# Patient Record
Sex: MALE | Race: WHITE | NOT HISPANIC OR LATINO | Employment: OTHER | RURAL
[De-identification: names, ages, dates, MRNs, and addresses within clinical notes are randomized per-mention and may not be internally consistent; named-entity substitution may affect disease eponyms.]

---

## 2022-12-23 ENCOUNTER — HOSPITAL ENCOUNTER (INPATIENT)
Facility: HOSPITAL | Age: 77
LOS: 13 days | Discharge: HOME | DRG: 950 | End: 2023-01-05
Attending: HOSPITALIST | Admitting: HOSPITALIST
Payer: MEDICARE

## 2022-12-23 DIAGNOSIS — Z96.641 STATUS POST TOTAL REPLACEMENT OF RIGHT HIP: ICD-10-CM

## 2022-12-23 DIAGNOSIS — T81.40XA: Primary | ICD-10-CM

## 2022-12-23 PROBLEM — I10 HTN (HYPERTENSION): Status: ACTIVE | Noted: 2022-12-23

## 2022-12-23 LAB
BILIRUB UR QL STRIP: NEGATIVE
CLARITY UR: CLEAR
COLOR UR: YELLOW
GLUCOSE UR STRIP-MCNC: NEGATIVE MG/DL
KETONES UR STRIP-SCNC: NEGATIVE MG/DL
LEUKOCYTE ESTERASE UR QL STRIP: NEGATIVE
NITRITE UR QL STRIP: NEGATIVE
PH UR STRIP: 7 PH UNITS
PROT UR QL STRIP: NEGATIVE
RBC # UR STRIP: NEGATIVE /UL
SP GR UR STRIP: 1.01
UROBILINOGEN UR STRIP-ACNC: 0.2 MG/DL

## 2022-12-23 PROCEDURE — 97110 THERAPEUTIC EXERCISES: CPT

## 2022-12-23 PROCEDURE — 11000004 HC SNF PRIVATE

## 2022-12-23 PROCEDURE — 99305 1ST NF CARE MODERATE MDM 35: CPT | Mod: AI,,, | Performed by: HOSPITALIST

## 2022-12-23 PROCEDURE — 81003 URINALYSIS AUTO W/O SCOPE: CPT | Performed by: HOSPITALIST

## 2022-12-23 PROCEDURE — 97163 PT EVAL HIGH COMPLEX 45 MIN: CPT

## 2022-12-23 PROCEDURE — 27000958

## 2022-12-23 PROCEDURE — 99900035 HC TECH TIME PER 15 MIN (STAT)

## 2022-12-23 PROCEDURE — 25000003 PHARM REV CODE 250

## 2022-12-23 PROCEDURE — 63600175 PHARM REV CODE 636 W HCPCS

## 2022-12-23 PROCEDURE — 99305 PR NURSING FACILITY CARE, INIT, MOD SEVERITY: ICD-10-PCS | Mod: AI,,, | Performed by: HOSPITALIST

## 2022-12-23 RX ORDER — ACETAMINOPHEN 325 MG/1
650 TABLET ORAL EVERY 6 HOURS PRN
Status: DISCONTINUED | OUTPATIENT
Start: 2022-12-23 | End: 2023-01-05 | Stop reason: HOSPADM

## 2022-12-23 RX ORDER — ERGOCALCIFEROL 1.25 MG/1
50000 CAPSULE ORAL
Status: DISCONTINUED | OUTPATIENT
Start: 2022-12-26 | End: 2023-01-05 | Stop reason: HOSPADM

## 2022-12-23 RX ORDER — ONDANSETRON 4 MG/1
4 TABLET, ORALLY DISINTEGRATING ORAL EVERY 8 HOURS PRN
Status: DISCONTINUED | OUTPATIENT
Start: 2022-12-23 | End: 2023-01-05 | Stop reason: HOSPADM

## 2022-12-23 RX ORDER — LANOLIN ALCOHOL/MO/W.PET/CERES
1 CREAM (GRAM) TOPICAL DAILY
Status: DISCONTINUED | OUTPATIENT
Start: 2022-12-23 | End: 2023-01-05 | Stop reason: HOSPADM

## 2022-12-23 RX ORDER — ENOXAPARIN SODIUM 100 MG/ML
40 INJECTION SUBCUTANEOUS DAILY
Status: DISCONTINUED | OUTPATIENT
Start: 2022-12-23 | End: 2023-01-05 | Stop reason: HOSPADM

## 2022-12-23 RX ORDER — AMOXICILLIN 250 MG
1 CAPSULE ORAL 2 TIMES DAILY PRN
Status: DISCONTINUED | OUTPATIENT
Start: 2022-12-23 | End: 2023-01-05 | Stop reason: HOSPADM

## 2022-12-23 RX ORDER — IPRATROPIUM BROMIDE AND ALBUTEROL SULFATE 2.5; .5 MG/3ML; MG/3ML
3 SOLUTION RESPIRATORY (INHALATION) EVERY 4 HOURS PRN
Status: DISCONTINUED | OUTPATIENT
Start: 2022-12-23 | End: 2023-01-05 | Stop reason: HOSPADM

## 2022-12-23 RX ORDER — AMLODIPINE BESYLATE 10 MG/1
10 TABLET ORAL DAILY
Status: DISCONTINUED | OUTPATIENT
Start: 2022-12-23 | End: 2023-01-05 | Stop reason: HOSPADM

## 2022-12-23 RX ORDER — CALCIUM CARBONATE 200(500)MG
500 TABLET,CHEWABLE ORAL 2 TIMES DAILY PRN
Status: DISCONTINUED | OUTPATIENT
Start: 2022-12-23 | End: 2023-01-05 | Stop reason: HOSPADM

## 2022-12-23 RX ORDER — OXYCODONE AND ACETAMINOPHEN 5; 325 MG/1; MG/1
1 TABLET ORAL EVERY 6 HOURS PRN
Status: DISCONTINUED | OUTPATIENT
Start: 2022-12-23 | End: 2023-01-05 | Stop reason: HOSPADM

## 2022-12-23 RX ORDER — HYDROCORTISONE 1 %
CREAM (GRAM) TOPICAL 2 TIMES DAILY
Status: DISCONTINUED | OUTPATIENT
Start: 2022-12-23 | End: 2023-01-05 | Stop reason: HOSPADM

## 2022-12-23 RX ORDER — POLYETHYLENE GLYCOL 3350 17 G/17G
17 POWDER, FOR SOLUTION ORAL DAILY
Status: DISCONTINUED | OUTPATIENT
Start: 2022-12-23 | End: 2023-01-05 | Stop reason: HOSPADM

## 2022-12-23 RX ORDER — CETIRIZINE HYDROCHLORIDE 10 MG/1
10 TABLET ORAL DAILY
Status: DISCONTINUED | OUTPATIENT
Start: 2022-12-23 | End: 2023-01-05 | Stop reason: HOSPADM

## 2022-12-23 RX ORDER — TALC
6 POWDER (GRAM) TOPICAL NIGHTLY PRN
Status: DISCONTINUED | OUTPATIENT
Start: 2022-12-23 | End: 2023-01-05 | Stop reason: HOSPADM

## 2022-12-23 RX ADMIN — AMPICILLIN SODIUM 2 G: 2 INJECTION, POWDER, FOR SOLUTION INTRAMUSCULAR; INTRAVENOUS at 11:12

## 2022-12-23 RX ADMIN — AMPICILLIN SODIUM 2 G: 2 INJECTION, POWDER, FOR SOLUTION INTRAMUSCULAR; INTRAVENOUS at 03:12

## 2022-12-23 RX ADMIN — ENOXAPARIN SODIUM 40 MG: 100 INJECTION SUBCUTANEOUS at 04:12

## 2022-12-23 RX ADMIN — AMPICILLIN SODIUM 2 G: 2 INJECTION, POWDER, FOR SOLUTION INTRAMUSCULAR; INTRAVENOUS at 08:12

## 2022-12-23 RX ADMIN — ANTIPRURITIC (ANTI-ITCH): 1 CREAM TOPICAL at 11:12

## 2022-12-23 NOTE — H&P
[facility] Medicine  History & Physical    Patient Name: Bradley Mcneil  MRN: [id number]  Patient Class: IP- Swing  Admission Date: 12/23/2022  Attending Physician: Merrick Stoner DO   Primary Care Provider: Primary Doctor No         Patient information was obtained from patient, past medical records and ER records.     Subjective:     Principal Problem:Infection associated with surgery    Chief Complaint: No chief complaint on file.       HPI: 76yo WM with hx of HTN admit s/p R hip surgical site infection getting IV ABX.  [facility]  For some reason he was transferred here to finish required course of ABX.  He denies any CP, SOB, N/V or diarrhea.  Incision looks good.        No past medical history on file.    No past surgical history on file.    Review of patient's allergies indicates:  No Known Allergies    No current facility-administered medications on file prior to encounter.     No current outpatient medications on file prior to encounter.     Family History    None       Tobacco Use    Smoking status: Not on file    Smokeless tobacco: Not on file   Substance and Sexual Activity    Alcohol use: Not on file    Drug use: Not on file    Sexual activity: Not on file     Review of Systems   Constitutional:  Negative for appetite change, chills and fever.   Respiratory:  Negative for cough, shortness of breath and wheezing.    Cardiovascular:  Negative for chest pain, palpitations and leg swelling.   Gastrointestinal:  Negative for abdominal distention, diarrhea, nausea and vomiting.   Genitourinary:  Negative for dysuria.   Skin:  Negative for rash.   Neurological:  Negative for dizziness, seizures and syncope.   Psychiatric/Behavioral:  Negative for agitation, behavioral problems and confusion.    All other systems reviewed and are negative.  Objective:     Vital Signs (Most Recent):  Temp: 98.3 °F (36.8 °C) (12/23/22  1029)  Pulse: 84 (12/23/22 1029)  Resp: 20 (12/23/22 1029)  BP: (!) 163/73 (12/23/22 1029)  SpO2: 97 % (12/23/22 1029)   Vital Signs (24h Range):  Temp:  [98.3 °F (36.8 °C)] 98.3 °F (36.8 °C)  Pulse:  [84] 84  Resp:  [20] 20  SpO2:  [97 %] 97 %  BP: (163)/(73) 163/73     Weight: 94.4 kg (208 lb 1.6 oz)  Body mass index is 29.86 kg/m².    Physical Exam  Vitals reviewed.   Constitutional:       General: He is not in acute distress.     Appearance: Normal appearance.   HENT:      Head: Normocephalic and atraumatic.   Eyes:      General: No scleral icterus.     Extraocular Movements: Extraocular movements intact.      Conjunctiva/sclera: Conjunctivae normal.      Pupils: Pupils are equal, round, and reactive to light.   Cardiovascular:      Rate and Rhythm: Normal rate and regular rhythm.      Heart sounds: No murmur heard.    No friction rub. No gallop.   Pulmonary:      Effort: Pulmonary effort is normal. No respiratory distress.      Breath sounds: Normal breath sounds. No wheezing or rales.   Abdominal:      General: Abdomen is flat. Bowel sounds are normal. There is no distension.      Palpations: Abdomen is soft.      Tenderness: There is no abdominal tenderness. There is no guarding.   Musculoskeletal:         General: No swelling.   Skin:     General: Skin is warm and dry.      Coloration: Skin is not jaundiced.      Findings: No rash.      Comments: R hip incision CDI   Neurological:      General: No focal deficit present.      Mental Status: He is alert and oriented to person, place, and time.      Sensory: No sensory deficit.      Motor: No weakness.   Psychiatric:         Mood and Affect: Mood normal.         Thought Content: Thought content normal.         Judgment: Judgment normal.         CRANIAL NERVES     CN III, IV, VI   Pupils are equal, round, and reactive to light.     Significant Labs: All pertinent labs within the past 24 hours have been reviewed.  Recent Lab Results       None             Significant Imaging: I have reviewed all pertinent imaging results/findings within the past 24 hours.    Assessment/Plan:     * Infection associated with surgery  Continue Ampicillin as ordered.        HTN (hypertension)  Continue home meds and adjust as needed.       VTE Risk Mitigation (From admission, onward)         Ordered     enoxaparin injection 40 mg  Daily         12/23/22 1035     IP VTE HIGH RISK PATIENT  Once         12/23/22 1035     Place sequential compression device  Until discontinued         12/23/22 1035                   Merrick Stoner DO  Department of Hospital Medicine   [facility]

## 2022-12-23 NOTE — PLAN OF CARE
Problem: Physical Therapy  Goal: Physical Therapy Goal  Description: LTG - 2 weeks  1. Pt's Tinetti Balance score will be at least 18/28 points using RW or more appropriate AD.  2. Pt's RLE strength will be at least 4/5.  3. Pt will gain 5-10 degrees ROM right hip.  4. Pt will negotiate stairs with SBA.  5. Pt will be assessed for progression to SC or QC for ambulation.  Outcome: Ongoing, Progressing

## 2022-12-23 NOTE — HPI
76yo WM with hx of HTN admit s/p R hip surgical site infection getting IV ABX.  [facility].  For some reason he was transferred here to finish required course of ABX.  He denies any CP, SOB, N/V or diarrhea.  Incision looks good.

## 2022-12-23 NOTE — PT/OT/SLP EVAL
Occupational Therapy   Evaluation    Name: Bradley Mcneil  MRN: [id number]  Admitting Diagnosis: Infection associated with surgery  Recent Surgery: * No surgery found *      Recommendations:     Discharge Recommendations:  Home  Discharge Equipment Recommendations:     Barriers to discharge:       Assessment:     Bradley Mcneil is a 77 y.o. male with a medical diagnosis of Infection associated with surgery.  He presents with 13 post-op for I&D and wound closure from infection at incision site after total R hip arthroplasty. Pt admitted for 12 day IV antibiotic therapy Performance deficits affecting function:  Pt does not require OT services at this time. OT will be availabe to reassess if Pt's status changes     Rehab Prognosis: Good; patient would benefit from acute skilled OT services to address these deficits and reach maximum level of function.       Plan:     Patient to be seen   to address the above listed problems via    Plan of Care Expires:    Plan of Care Reviewed with:      Subjective     Chief Complaint: no complaints  Patient/Family Comments/goals: to return home to previous I ADL status    Occupational Profile:  Living Environment: mobile home alone  Previous level of function: I  Roles and Routines: father  Equipment Used at Home: rollator  Assistance upon Discharge: none    Pain/Comfort:  Pain Rating 1: 0/10    Patients cultural, spiritual, Sabianist conflicts given the current situation:      Objective:     Communicated with: Pt prior to session.  Patient found sitting edge of bed with   upon OT entry to room.    General Precautions: Standard, fall  Orthopedic Precautions:    Braces:    Respiratory Status: Room air    Occupational Performance:    Bed Mobility:    Patient completed Rolling/Turning to Left with  independence  Patient completed Rolling/Turning to Right with independence  Patient completed Scooting/Bridging with independence  Patient completed Supine to Sit with independence  Patient  completed Sit to Supine with independence    Functional Mobility/Transfers:  Patient completed Sit <> Stand Transfer with independence  with  no assistive device   Functional Mobility: Pt ambluated bed>< sink with RW    Activities of Daily Living:  Pt I with all ADLs, OT provided and educated Pt on use of reacher and sock for I in LE dressing    Cognitive/Visual Perceptual:      Physical Exam:  Pt has no Bue deficits    AMPAC 6 Click ADL:  AMPAC Total Score: 24    Treatment & Education:  Pt educated on us of AD for LE dressing, Pt demo'd understanding    Patient left sitting edge of bed with  nursing present    GOALS:   Multidisciplinary Problems       Occupational Therapy Goals       Not on file                    History:     No past medical history on file.    No past surgical history on file.    Time Tracking:     OT Date of Treatment:    OT Start Time: 1115  OT Stop Time: 1145  OT Total Time (min): 30 min    Billable Minutes:Evaluation 30    12/23/2022

## 2022-12-23 NOTE — SUBJECTIVE & OBJECTIVE
No past medical history on file.    No past surgical history on file.    Review of patient's allergies indicates:  No Known Allergies    No current facility-administered medications on file prior to encounter.     No current outpatient medications on file prior to encounter.     Family History    None       Tobacco Use    Smoking status: Not on file    Smokeless tobacco: Not on file   Substance and Sexual Activity    Alcohol use: Not on file    Drug use: Not on file    Sexual activity: Not on file     Review of Systems   Constitutional:  Negative for appetite change, chills and fever.   Respiratory:  Negative for cough, shortness of breath and wheezing.    Cardiovascular:  Negative for chest pain, palpitations and leg swelling.   Gastrointestinal:  Negative for abdominal distention, diarrhea, nausea and vomiting.   Genitourinary:  Negative for dysuria.   Skin:  Negative for rash.   Neurological:  Negative for dizziness, seizures and syncope.   Psychiatric/Behavioral:  Negative for agitation, behavioral problems and confusion.    All other systems reviewed and are negative.  Objective:     Vital Signs (Most Recent):  Temp: 98.3 °F (36.8 °C) (12/23/22 1029)  Pulse: 84 (12/23/22 1029)  Resp: 20 (12/23/22 1029)  BP: (!) 163/73 (12/23/22 1029)  SpO2: 97 % (12/23/22 1029)   Vital Signs (24h Range):  Temp:  [98.3 °F (36.8 °C)] 98.3 °F (36.8 °C)  Pulse:  [84] 84  Resp:  [20] 20  SpO2:  [97 %] 97 %  BP: (163)/(73) 163/73     Weight: 94.4 kg (208 lb 1.6 oz)  Body mass index is 29.86 kg/m².    Physical Exam  Vitals reviewed.   Constitutional:       General: He is not in acute distress.     Appearance: Normal appearance.   HENT:      Head: Normocephalic and atraumatic.   Eyes:      General: No scleral icterus.     Extraocular Movements: Extraocular movements intact.      Conjunctiva/sclera: Conjunctivae normal.      Pupils: Pupils are equal, round, and reactive to light.   Cardiovascular:      Rate and Rhythm: Normal rate and  regular rhythm.      Heart sounds: No murmur heard.    No friction rub. No gallop.   Pulmonary:      Effort: Pulmonary effort is normal. No respiratory distress.      Breath sounds: Normal breath sounds. No wheezing or rales.   Abdominal:      General: Abdomen is flat. Bowel sounds are normal. There is no distension.      Palpations: Abdomen is soft.      Tenderness: There is no abdominal tenderness. There is no guarding.   Musculoskeletal:         General: No swelling.   Skin:     General: Skin is warm and dry.      Coloration: Skin is not jaundiced.      Findings: No rash.      Comments: R hip incision CDI   Neurological:      General: No focal deficit present.      Mental Status: He is alert and oriented to person, place, and time.      Sensory: No sensory deficit.      Motor: No weakness.   Psychiatric:         Mood and Affect: Mood normal.         Thought Content: Thought content normal.         Judgment: Judgment normal.         CRANIAL NERVES     CN III, IV, VI   Pupils are equal, round, and reactive to light.     Significant Labs: All pertinent labs within the past 24 hours have been reviewed.  Recent Lab Results       None            Significant Imaging: I have reviewed all pertinent imaging results/findings within the past 24 hours.

## 2022-12-23 NOTE — PT/OT/SLP EVAL
Physical Therapy Evaluation    Patient Name:  Bradley Mcneil   MRN:  [id number]    Recommendations:     Discharge Recommendations: home health PT   Discharge Equipment Recommendations: none   Barriers to discharge: Inaccessible home, Decreased caregiver support, and ABT    Assessment:     Bradley Mcneil is a 77 y.o. male admitted with a medical diagnosis of Infection associated with surgery.  He presents with the following impairments/functional limitations: weakness, impaired balance, pain, decreased ROM, impaired skin , Antibiotic therapy.    The pt had a right anterior approach LIEN d/t OA by Dr. Merrick Junior on 11/9/22. He went to [facility] for swing bed and developed infection with dehiscence of wound. He then was admitted to [facility] for I&D of hip 11/22/22. He was admitted to [facility] from 12/5/22 to 12/23/22 for ABT. He is admitted to [facility] as a step-down facility to complete 12 more days of ABT which is scheduled to be completed 1/3/2023. After that, he plans to dc to his home. He is now 31 days status post I&D.    He is at high falls risks with Tinetti score of only 15/28 using RW. He has only 3- to 3+/5 strength RLE, whereas he has 4+/5 LLE. He currently uses a RW with supervision, but would benefit from progression to a SC or QC so he can negotiated his stairs at home since he lives alone. He has limited ROM about right hip. He is WBAT RLE.    Rehab Prognosis: Good; patient would benefit from acute skilled PT services to address these deficits and reach maximum level of function.    Recent Surgery: * No surgery found *      Plan:     During this hospitalization, patient to be seen 5 x/week to address the identified rehab impairments via gait training, therapeutic activities, therapeutic exercises and progress toward the following goals:    Plan of Care Expires:  01/06/23    Subjective     Chief Complaint: Pt c/o right hip pain 5/10. States his dtr will bring him clothes  tonight.  Patient/Family Comments/goals: DC to his home.  Pain/Comfort:  Pain Rating 1: 5/10  Location - Side 1: Right  Location - Orientation 1: lower  Location 1: hip (anterior hip)  Pain Addressed 1: Pre-medicate for activity  Pain Rating Post-Intervention 1: 5/10    Patients cultural, spiritual, Latter day conflicts given the current situation: no    Living Environment:  Pt lives alone in a mobile home with 6 steps/samantha rails.  Prior to admission, patients level of function was modif indep using rollator for hip pain.  Equipment used at home: rollator, bedside commode, grab bar, cane, straight.  DME owned (not currently used): single point cane.  Upon discharge, patient will have assistance from home health.    Objective:     Communicated with OT and nsg prior to session.  Patient found sitting edge of bed with PICC line  upon PT entry to room.    General Precautions: Standard, fall  Orthopedic Precautions:RLE weight bearing as tolerated   Braces:    Respiratory Status: Room air    Exams:  Skin Integrity/Edema:      -       Skin integrity: Right anterior hip incision, healed. No s/s infection.  RLE ROM: Deficits: hip flex = 85 deg, hip ER = 23 deg, hip abd = 10 deg.  RLE Strength: Deficits: hip flex =3+/5, hip ext=3-/5, hip abd=3+/5, hip add=3/5, hip IR/ER=3/5, knee ext=4/5, ankle PF=4/5  LLE ROM: Deficits: hip flex = 100 deg, hip ER = 27 deg, hip abd = 34 deg.  LLE Strength: grossly 4+/5    Functional Mobility:  Bed Mobility:     Rolling Left:  modified independence  Rolling Right: modified independence  Scooting: independence  Bridging: supervision  Supine to Sit: supervision  Sit to Supine: supervision  Transfers:     Sit to Stand:  supervision with no AD  Bed to Chair: supervision with  rolling walker  using  Stand Pivot  Gait: 300 ft with svn using RW. Wide LUIS.  Stairs:  Pt ascended/descended NOT TESTED DUE TO INCLEMENT WEATHER with NA with NA with Activity did not occur.   Tinetti Total Score:   15/25  USING RW < 18 = High Risk of Falls, 19-23 = Moderate Risk of Falls, > 24 = Low Risk of Falls      AM-PAC 6 CLICK MOBILITY  Total Score:21       Treatment & Education:  Eval performed. Pt loaned RW and cleared to use in his room and nursing station ad aubrey. Pt performed issued HEP: Bridging x 10, right Clamshell x 10, seated marching x 10, STS from bed x 5, standing PF x 10 using RW for support. Pt instructed to perform HEP twice a day over the weekend and to walk laps around the nsg station. Pt expressed understanding. Pt instructed that PT will be 5x/week Mon-Fri except no PT next Monday d/t recognized holiday. PT will assess whether pt will be able to progress from RW to SC or QC prior to dc home in 12 days.    Patient left sitting edge of bed with call button in reach and CNA present.    GOALS:   Multidisciplinary Problems       Physical Therapy Goals          Problem: Physical Therapy    Goal Priority Disciplines Outcome Goal Variances Interventions   Physical Therapy Goal     PT, PT/OT Ongoing, Progressing     Description: LTG - 2 weeks  1. Pt's Tinetti Balance score will be at least 18/28 points using RW or more appropriate AD.  2. Pt's RLE strength will be at least 4/5.  3. Pt will gain 5-10 degrees ROM right hip.  4. Pt will negotiate stairs with SBA.  5. Pt will be assessed for progression to SC or QC for ambulation.                       History:     No past medical history on file.    No past surgical history on file.    Time Tracking:     PT Received On: 12/23/22  PT Start Time: 1331     PT Stop Time: 1428  PT Total Time (min): 57 min     Billable Minutes: Evaluation 32, Therapeutic Exercise 25, and Total Time 57 min      12/23/2022

## 2022-12-24 LAB
ANION GAP SERPL CALCULATED.3IONS-SCNC: 10 MMOL/L (ref 7–16)
BASOPHILS # BLD AUTO: 0.03 K/UL (ref 0–0.2)
BASOPHILS NFR BLD AUTO: 0.5 % (ref 0–1)
BUN SERPL-MCNC: 12 MG/DL (ref 7–18)
BUN/CREAT SERPL: 14 (ref 6–20)
CALCIUM SERPL-MCNC: 8.4 MG/DL (ref 8.5–10.1)
CHLORIDE SERPL-SCNC: 105 MMOL/L (ref 98–107)
CO2 SERPL-SCNC: 31 MMOL/L (ref 21–32)
CREAT SERPL-MCNC: 0.84 MG/DL (ref 0.7–1.3)
DIFFERENTIAL METHOD BLD: ABNORMAL
EGFR (NO RACE VARIABLE) (RUSH/TITUS): 89 ML/MIN/1.73M²
EOSINOPHIL # BLD AUTO: 0.5 K/UL (ref 0–0.5)
EOSINOPHIL NFR BLD AUTO: 8.5 % (ref 1–4)
ERYTHROCYTE [DISTWIDTH] IN BLOOD BY AUTOMATED COUNT: 12.6 % (ref 11.5–14.5)
EST. AVERAGE GLUCOSE BLD GHB EST-MCNC: 114 MG/DL
GLUCOSE SERPL-MCNC: 105 MG/DL (ref 74–106)
HBA1C MFR BLD HPLC: 6 % (ref 4.5–6.6)
HCT VFR BLD AUTO: 32.5 % (ref 40–54)
HGB BLD-MCNC: 10.6 G/DL (ref 13.5–18)
LYMPHOCYTES # BLD AUTO: 1.35 K/UL (ref 1–4.8)
LYMPHOCYTES NFR BLD AUTO: 23 % (ref 27–41)
MCH RBC QN AUTO: 33.2 PG (ref 27–31)
MCHC RBC AUTO-ENTMCNC: 32.6 G/DL (ref 32–36)
MCV RBC AUTO: 101.9 FL (ref 80–96)
MONOCYTES # BLD AUTO: 0.58 K/UL (ref 0–0.8)
MONOCYTES NFR BLD AUTO: 9.9 % (ref 2–6)
MPC BLD CALC-MCNC: 8.8 FL (ref 9.4–12.4)
NEUTROPHILS # BLD AUTO: 3.4 K/UL (ref 1.8–7.7)
NEUTROPHILS NFR BLD AUTO: 58.1 % (ref 53–65)
PLATELET # BLD AUTO: 238 K/UL (ref 150–400)
POTASSIUM SERPL-SCNC: 4.1 MMOL/L (ref 3.5–5.1)
RBC # BLD AUTO: 3.19 M/UL (ref 4.6–6.2)
SODIUM SERPL-SCNC: 142 MMOL/L (ref 136–145)
WBC # BLD AUTO: 5.86 K/UL (ref 4.5–11)

## 2022-12-24 PROCEDURE — 36415 COLL VENOUS BLD VENIPUNCTURE: CPT

## 2022-12-24 PROCEDURE — 63600175 PHARM REV CODE 636 W HCPCS

## 2022-12-24 PROCEDURE — 80048 BASIC METABOLIC PNL TOTAL CA: CPT

## 2022-12-24 PROCEDURE — 25000003 PHARM REV CODE 250

## 2022-12-24 PROCEDURE — 11000004 HC SNF PRIVATE

## 2022-12-24 PROCEDURE — 27000958

## 2022-12-24 PROCEDURE — 85025 COMPLETE CBC W/AUTO DIFF WBC: CPT

## 2022-12-24 PROCEDURE — 83036 HEMOGLOBIN GLYCOSYLATED A1C: CPT | Performed by: HOSPITALIST

## 2022-12-24 RX ADMIN — POLYETHYLENE GLYCOL 3350 17 G: 17 POWDER, FOR SOLUTION ORAL at 08:12

## 2022-12-24 RX ADMIN — CETIRIZINE HYDROCHLORIDE 10 MG: 10 TABLET, FILM COATED ORAL at 08:12

## 2022-12-24 RX ADMIN — AMLODIPINE BESYLATE 10 MG: 10 TABLET ORAL at 08:12

## 2022-12-24 RX ADMIN — AMPICILLIN SODIUM 2 G: 2 INJECTION, POWDER, FOR SOLUTION INTRAMUSCULAR; INTRAVENOUS at 12:12

## 2022-12-24 RX ADMIN — AMPICILLIN SODIUM 2 G: 2 INJECTION, POWDER, FOR SOLUTION INTRAMUSCULAR; INTRAVENOUS at 08:12

## 2022-12-24 RX ADMIN — AMPICILLIN SODIUM 2 G: 2 INJECTION, POWDER, FOR SOLUTION INTRAMUSCULAR; INTRAVENOUS at 03:12

## 2022-12-24 RX ADMIN — FERROUS SULFATE TAB 325 MG (65 MG ELEMENTAL FE) 1 EACH: 325 (65 FE) TAB at 08:12

## 2022-12-24 RX ADMIN — AMPICILLIN SODIUM 2 G: 2 INJECTION, POWDER, FOR SOLUTION INTRAMUSCULAR; INTRAVENOUS at 04:12

## 2022-12-24 RX ADMIN — ANTIPRURITIC (ANTI-ITCH): 1 CREAM TOPICAL at 09:12

## 2022-12-24 RX ADMIN — AMPICILLIN SODIUM 2 G: 2 INJECTION, POWDER, FOR SOLUTION INTRAMUSCULAR; INTRAVENOUS at 07:12

## 2022-12-24 RX ADMIN — AMPICILLIN SODIUM 2 G: 2 INJECTION, POWDER, FOR SOLUTION INTRAMUSCULAR; INTRAVENOUS at 11:12

## 2022-12-24 RX ADMIN — ENOXAPARIN SODIUM 40 MG: 100 INJECTION SUBCUTANEOUS at 05:12

## 2022-12-24 NOTE — PLAN OF CARE
Problem: Breathing Pattern Ineffective  Goal: Effective Breathing Pattern  12/24/2022 1041 by Tracy Bobo, RRT  Outcome: Ongoing, Progressing  12/24/2022 1041 by Tracy Bobo, RRT  Outcome: Ongoing, Progressing

## 2022-12-25 PROCEDURE — 25000003 PHARM REV CODE 250

## 2022-12-25 PROCEDURE — 27000958

## 2022-12-25 PROCEDURE — 63600175 PHARM REV CODE 636 W HCPCS

## 2022-12-25 PROCEDURE — 11000004 HC SNF PRIVATE

## 2022-12-25 RX ADMIN — AMPICILLIN SODIUM 2 G: 2 INJECTION, POWDER, FOR SOLUTION INTRAMUSCULAR; INTRAVENOUS at 11:12

## 2022-12-25 RX ADMIN — FERROUS SULFATE TAB 325 MG (65 MG ELEMENTAL FE) 1 EACH: 325 (65 FE) TAB at 08:12

## 2022-12-25 RX ADMIN — AMLODIPINE BESYLATE 10 MG: 10 TABLET ORAL at 08:12

## 2022-12-25 RX ADMIN — ACETAMINOPHEN 650 MG: 325 TABLET ORAL at 04:12

## 2022-12-25 RX ADMIN — AMPICILLIN SODIUM 2 G: 2 INJECTION, POWDER, FOR SOLUTION INTRAMUSCULAR; INTRAVENOUS at 08:12

## 2022-12-25 RX ADMIN — CETIRIZINE HYDROCHLORIDE 10 MG: 10 TABLET, FILM COATED ORAL at 08:12

## 2022-12-25 RX ADMIN — AMPICILLIN SODIUM 2 G: 2 INJECTION, POWDER, FOR SOLUTION INTRAMUSCULAR; INTRAVENOUS at 03:12

## 2022-12-25 RX ADMIN — ANTIPRURITIC (ANTI-ITCH): 1 CREAM TOPICAL at 08:12

## 2022-12-25 RX ADMIN — AMPICILLIN SODIUM 2 G: 2 INJECTION, POWDER, FOR SOLUTION INTRAMUSCULAR; INTRAVENOUS at 07:12

## 2022-12-25 RX ADMIN — ENOXAPARIN SODIUM 40 MG: 100 INJECTION SUBCUTANEOUS at 04:12

## 2022-12-25 RX ADMIN — POLYETHYLENE GLYCOL 3350 17 G: 17 POWDER, FOR SOLUTION ORAL at 08:12

## 2022-12-25 NOTE — PLAN OF CARE
Problem: Adult Inpatient Plan of Care  Goal: Patient-Specific Goal (Individualized)  Outcome: Ongoing, Progressing      1

## 2022-12-25 NOTE — NURSING
Nurses Note -- 4 Eyes      12/24/2022   7:40 PM      Skin assessed during: Q Shift Change      [] No Pressure Injuries Present    []Prevention Measures Documented      [] Yes- Altered Skin Integrity Present or Discovered   [] LDA Added if Not in Epic (Describe Wound)   [x] New Altered Skin Integrity was Present on Admit and Documented in LDA   [] Wound Image Taken    Wound Care Consulted? No    Attending Nurse:  Zoraida Harvey RN     Second RN/Staff Member: Terra Cordova RN

## 2022-12-26 PROCEDURE — 11000004 HC SNF PRIVATE

## 2022-12-26 PROCEDURE — 99308 SBSQ NF CARE LOW MDM 20: CPT | Mod: ,,, | Performed by: HOSPITALIST

## 2022-12-26 PROCEDURE — 63600175 PHARM REV CODE 636 W HCPCS

## 2022-12-26 PROCEDURE — 25000003 PHARM REV CODE 250

## 2022-12-26 PROCEDURE — 99900035 HC TECH TIME PER 15 MIN (STAT)

## 2022-12-26 PROCEDURE — 99308 PR NURSING FAC CARE, SUBSEQ, MINOR COMPLIC: ICD-10-PCS | Mod: ,,, | Performed by: HOSPITALIST

## 2022-12-26 PROCEDURE — 27000958

## 2022-12-26 RX ADMIN — AMPICILLIN SODIUM 2 G: 2 INJECTION, POWDER, FOR SOLUTION INTRAMUSCULAR; INTRAVENOUS at 08:12

## 2022-12-26 RX ADMIN — ENOXAPARIN SODIUM 40 MG: 100 INJECTION SUBCUTANEOUS at 04:12

## 2022-12-26 RX ADMIN — AMLODIPINE BESYLATE 10 MG: 10 TABLET ORAL at 08:12

## 2022-12-26 RX ADMIN — AMPICILLIN SODIUM 2 G: 2 INJECTION, POWDER, FOR SOLUTION INTRAMUSCULAR; INTRAVENOUS at 12:12

## 2022-12-26 RX ADMIN — AMPICILLIN SODIUM 2 G: 2 INJECTION, POWDER, FOR SOLUTION INTRAMUSCULAR; INTRAVENOUS at 11:12

## 2022-12-26 RX ADMIN — POLYETHYLENE GLYCOL 3350 17 G: 17 POWDER, FOR SOLUTION ORAL at 08:12

## 2022-12-26 RX ADMIN — FERROUS SULFATE TAB 325 MG (65 MG ELEMENTAL FE) 1 EACH: 325 (65 FE) TAB at 08:12

## 2022-12-26 RX ADMIN — ANTIPRURITIC (ANTI-ITCH): 1 CREAM TOPICAL at 08:12

## 2022-12-26 RX ADMIN — AMPICILLIN SODIUM 2 G: 2 INJECTION, POWDER, FOR SOLUTION INTRAMUSCULAR; INTRAVENOUS at 07:12

## 2022-12-26 RX ADMIN — AMPICILLIN SODIUM 2 G: 2 INJECTION, POWDER, FOR SOLUTION INTRAMUSCULAR; INTRAVENOUS at 04:12

## 2022-12-26 RX ADMIN — ERGOCALCIFEROL 50000 UNITS: 1.25 CAPSULE, LIQUID FILLED ORAL at 08:12

## 2022-12-26 RX ADMIN — CETIRIZINE HYDROCHLORIDE 10 MG: 10 TABLET, FILM COATED ORAL at 08:12

## 2022-12-26 NOTE — HOSPITAL COURSE
12/26 He is doing well.  Doing some exercises in bed for hip.  On IV ABX and will finish course here.  No complaints.    1/2: Mr Mcneil found sitting un bedside chair in NAD. He has no complaints. Normal BM this AM. He is participating well with therapy. Labs reviewed and notable as follows: Sed rate 82. H&H 13.1 and 39.2 which is improved since admission. VS have remained stable. Case discussed with Dr Howard via Telemedicine consultation. Continue current POC with rehabilitation services and IV Ampicillin through 1/5. Plan for D/C when IV abx complete.

## 2022-12-26 NOTE — PLAN OF CARE
Problem: Adult Inpatient Plan of Care  Goal: Patient-Specific Goal (Individualized)  Outcome: Ongoing, Progressing   Patient will receive the ordered number of iv antibiotics and have no problems with incision.

## 2022-12-26 NOTE — NURSING
Nurses Note -- 4 Eyes      12/25/2022   9:30 PM      Skin assessed during: Q Shift Change      [] No Pressure Injuries Present    []Prevention Measures Documented      [] Yes- Altered Skin Integrity Present or Discovered   [] LDA Added if Not in Epic (Describe Wound)   [x] New Altered Skin Integrity was Present on Admit and Documented in LDA   [] Wound Image Taken    Wound Care Consulted? No    Attending Nurse:  Zoraida Harvey RN     Second RN/Staff Member:  Katherine Pineda RN

## 2022-12-26 NOTE — PROGRESS NOTES
[facility] Medicine  Progress Note    Patient Name: Bradley Mcneil  MRN: [id number]  Patient Class: IP- Swing   Admission Date: 12/23/2022  Length of Stay: 3 days  Attending Physician: Merrick Stoner DO  Primary Care Provider: Primary Doctor No        Subjective:     Principal Problem:Infection associated with surgery        HPI:  78yo WM with hx of HTN admit s/p R hip surgical site infection getting IV ABX.  [facility]  For some reason he was transferred here to finish required course of ABX.  He denies any CP, SOB, N/V or diarrhea.  Incision looks good.        Overview/Hospital Course:  12/26 He is doing well.  Doing some exercises in bed for hip.  On IV ABX and will finish course here.  No complaints.      Interval History: doing well, no issues or complaints     Review of Systems   Respiratory:  Negative for shortness of breath.    Cardiovascular:  Negative for chest pain.   Gastrointestinal:  Negative for abdominal pain, nausea and vomiting.   Psychiatric/Behavioral:  Negative for agitation and confusion.    All other systems reviewed and are negative.  Objective:     Vital Signs (Most Recent):  Temp: 97.6 °F (36.4 °C) (12/26/22 0736)  Pulse: 69 (12/26/22 0736)  Resp: 20 (12/26/22 0736)  BP: (!) 163/78 (12/26/22 0736)  SpO2: 97 % (12/26/22 0736)   Vital Signs (24h Range):  Temp:  [97.6 °F (36.4 °C)-98.2 °F (36.8 °C)] 97.6 °F (36.4 °C)  Pulse:  [69-76] 69  Resp:  [20] 20  SpO2:  [96 %-97 %] 97 %  BP: (123-163)/(65-78) 163/78     Weight: 94.4 kg (208 lb 1.6 oz)  Body mass index is 29.86 kg/m².    Intake/Output Summary (Last 24 hours) at 12/26/2022 1242  Last data filed at 12/26/2022 0821  Gross per 24 hour   Intake 2508 ml   Output --   Net 2508 ml      Physical Exam  Vitals reviewed.   Constitutional:       General: He is not in acute distress.     Appearance: Normal appearance.   HENT:      Head: Normocephalic and atraumatic.    Eyes:      General: No scleral icterus.     Extraocular Movements: Extraocular movements intact.      Conjunctiva/sclera: Conjunctivae normal.      Pupils: Pupils are equal, round, and reactive to light.   Cardiovascular:      Rate and Rhythm: Normal rate and regular rhythm.      Heart sounds: No murmur heard.    No friction rub. No gallop.   Pulmonary:      Effort: Pulmonary effort is normal. No respiratory distress.      Breath sounds: Normal breath sounds. No wheezing or rales.   Abdominal:      General: Abdomen is flat. Bowel sounds are normal. There is no distension.      Palpations: Abdomen is soft.      Tenderness: There is no abdominal tenderness. There is no guarding.   Musculoskeletal:         General: No swelling.   Skin:     General: Skin is warm and dry.      Coloration: Skin is not jaundiced.      Findings: No rash.      Comments: R hip incision CDI   Neurological:      General: No focal deficit present.      Mental Status: He is alert and oriented to person, place, and time.      Sensory: No sensory deficit.      Motor: No weakness.   Psychiatric:         Mood and Affect: Mood normal.         Thought Content: Thought content normal.         Judgment: Judgment normal.       Significant Labs: All pertinent labs within the past 24 hours have been reviewed.  Recent Lab Results       None            Significant Imaging: I have reviewed all pertinent imaging results/findings within the past 24 hours.      Assessment/Plan:      * Infection associated with surgery  Continue Ampicillin as ordered.        HTN (hypertension)  Continue home meds and adjust as needed.         VTE Risk Mitigation (From admission, onward)         Ordered     enoxaparin injection 40 mg  Daily         12/23/22 1035     IP VTE HIGH RISK PATIENT  Once         12/23/22 1035     Place sequential compression device  Until discontinued         12/23/22 1035                Discharge Planning   RUFINO: 1/4/2023     Code Status: Full Code   Is  the patient medically ready for discharge?:     Reason for patient still in hospital (select all that apply): Treatment and PT / OT recommendations                     Merrick Stoner DO  Department of Hospital Medicine   [facility]

## 2022-12-26 NOTE — SUBJECTIVE & OBJECTIVE
Interval History: doing well, no issues or complaints     Review of Systems   Respiratory:  Negative for shortness of breath.    Cardiovascular:  Negative for chest pain.   Gastrointestinal:  Negative for abdominal pain, nausea and vomiting.   Psychiatric/Behavioral:  Negative for agitation and confusion.    All other systems reviewed and are negative.  Objective:     Vital Signs (Most Recent):  Temp: 97.6 °F (36.4 °C) (12/26/22 0736)  Pulse: 69 (12/26/22 0736)  Resp: 20 (12/26/22 0736)  BP: (!) 163/78 (12/26/22 0736)  SpO2: 97 % (12/26/22 0736)   Vital Signs (24h Range):  Temp:  [97.6 °F (36.4 °C)-98.2 °F (36.8 °C)] 97.6 °F (36.4 °C)  Pulse:  [69-76] 69  Resp:  [20] 20  SpO2:  [96 %-97 %] 97 %  BP: (123-163)/(65-78) 163/78     Weight: 94.4 kg (208 lb 1.6 oz)  Body mass index is 29.86 kg/m².    Intake/Output Summary (Last 24 hours) at 12/26/2022 1242  Last data filed at 12/26/2022 0821  Gross per 24 hour   Intake 2508 ml   Output --   Net 2508 ml      Physical Exam  Vitals reviewed.   Constitutional:       General: He is not in acute distress.     Appearance: Normal appearance.   HENT:      Head: Normocephalic and atraumatic.   Eyes:      General: No scleral icterus.     Extraocular Movements: Extraocular movements intact.      Conjunctiva/sclera: Conjunctivae normal.      Pupils: Pupils are equal, round, and reactive to light.   Cardiovascular:      Rate and Rhythm: Normal rate and regular rhythm.      Heart sounds: No murmur heard.    No friction rub. No gallop.   Pulmonary:      Effort: Pulmonary effort is normal. No respiratory distress.      Breath sounds: Normal breath sounds. No wheezing or rales.   Abdominal:      General: Abdomen is flat. Bowel sounds are normal. There is no distension.      Palpations: Abdomen is soft.      Tenderness: There is no abdominal tenderness. There is no guarding.   Musculoskeletal:         General: No swelling.   Skin:     General: Skin is warm and dry.      Coloration: Skin is  not jaundiced.      Findings: No rash.      Comments: R hip incision CDI   Neurological:      General: No focal deficit present.      Mental Status: He is alert and oriented to person, place, and time.      Sensory: No sensory deficit.      Motor: No weakness.   Psychiatric:         Mood and Affect: Mood normal.         Thought Content: Thought content normal.         Judgment: Judgment normal.       Significant Labs: All pertinent labs within the past 24 hours have been reviewed.  Recent Lab Results       None            Significant Imaging: I have reviewed all pertinent imaging results/findings within the past 24 hours.

## 2022-12-26 NOTE — PLAN OF CARE
Problem: Adult Inpatient Plan of Care  Goal: Absence of Hospital-Acquired Illness or Injury  Outcome: Ongoing, Progressing     Problem: Impaired Wound Healing  Goal: Optimal Wound Healing  Outcome: Ongoing, Progressing

## 2022-12-27 PROCEDURE — 63600175 PHARM REV CODE 636 W HCPCS

## 2022-12-27 PROCEDURE — 97116 GAIT TRAINING THERAPY: CPT | Mod: CQ

## 2022-12-27 PROCEDURE — 11000004 HC SNF PRIVATE

## 2022-12-27 PROCEDURE — 25000003 PHARM REV CODE 250

## 2022-12-27 PROCEDURE — 99900039 HC SLP GENERIC THERAPY SCREENING (STAT)

## 2022-12-27 PROCEDURE — 97110 THERAPEUTIC EXERCISES: CPT | Mod: CQ

## 2022-12-27 PROCEDURE — 27000958

## 2022-12-27 PROCEDURE — 99900035 HC TECH TIME PER 15 MIN (STAT)

## 2022-12-27 RX ADMIN — OXYCODONE AND ACETAMINOPHEN 1 TABLET: 325; 5 TABLET ORAL at 11:12

## 2022-12-27 RX ADMIN — ACETAMINOPHEN 650 MG: 325 TABLET ORAL at 12:12

## 2022-12-27 RX ADMIN — ACETAMINOPHEN 650 MG: 325 TABLET ORAL at 01:12

## 2022-12-27 RX ADMIN — AMPICILLIN SODIUM 2 G: 2 INJECTION, POWDER, FOR SOLUTION INTRAMUSCULAR; INTRAVENOUS at 04:12

## 2022-12-27 RX ADMIN — AMPICILLIN SODIUM 2 G: 2 INJECTION, POWDER, FOR SOLUTION INTRAMUSCULAR; INTRAVENOUS at 11:12

## 2022-12-27 RX ADMIN — AMPICILLIN SODIUM 2 G: 2 INJECTION, POWDER, FOR SOLUTION INTRAMUSCULAR; INTRAVENOUS at 03:12

## 2022-12-27 RX ADMIN — CETIRIZINE HYDROCHLORIDE 10 MG: 10 TABLET, FILM COATED ORAL at 08:12

## 2022-12-27 RX ADMIN — POLYETHYLENE GLYCOL 3350 17 G: 17 POWDER, FOR SOLUTION ORAL at 08:12

## 2022-12-27 RX ADMIN — FERROUS SULFATE TAB 325 MG (65 MG ELEMENTAL FE) 1 EACH: 325 (65 FE) TAB at 08:12

## 2022-12-27 RX ADMIN — AMLODIPINE BESYLATE 10 MG: 10 TABLET ORAL at 08:12

## 2022-12-27 RX ADMIN — AMPICILLIN SODIUM 2 G: 2 INJECTION, POWDER, FOR SOLUTION INTRAMUSCULAR; INTRAVENOUS at 08:12

## 2022-12-27 RX ADMIN — ANTIPRURITIC (ANTI-ITCH): 1 CREAM TOPICAL at 08:12

## 2022-12-27 RX ADMIN — ENOXAPARIN SODIUM 40 MG: 100 INJECTION SUBCUTANEOUS at 04:12

## 2022-12-27 NOTE — PROGRESS NOTES
Clinical Pharmacy Chart Review Note      Admit Date: 12/23/2022   LOS: 4 days       Bradley Mcneil is a 77 y.o. male admitted to [facility] for PT/OT after hospitalization for r hip surgical site infection.    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     Review of patient's allergies indicates:  No Known Allergies  Patient Active Problem List    Diagnosis Date Noted    Infection associated with surgery 12/23/2022    HTN (hypertension) 12/23/2022       Scheduled Meds:    amLODIPine  10 mg Oral Daily    ampicillin IVPB  2 g Intravenous Q4H    cetirizine  10 mg Oral Daily    enoxparin  40 mg Subcutaneous Daily    ergocalciferol  50,000 Units Oral Q7 Days    ferrous sulfate  1 tablet Oral Daily    hydrocortisone   Topical (Top) BID    polyethylene glycol  17 g Oral Daily     Continuous Infusions:   PRN Meds: acetaminophen, acetaminophen, albuterol-ipratropium, calcium carbonate, melatonin, ondansetron, oxyCODONE-acetaminophen, senna-docusate 8.6-50 mg    OBJECTIVE:     Vital Signs (Last 24H)  Temp:  [98 °F (36.7 °C)-98.3 °F (36.8 °C)]   Pulse:  [72-85]   Resp:  [18-20]   BP: (143-146)/(66-70)   SpO2:  [94 %-97 %]     Laboratory:  CBC:   Recent Labs   Lab 12/24/22  0603   WBC 5.86   RBC 3.19*   HGB 10.6*   HCT 32.5*      .9*   MCH 33.2*   MCHC 32.6     BMP:   Recent Labs   Lab 12/24/22  0603         K 4.1      CO2 31   BUN 12   CREATININE 0.84   CALCIUM 8.4*     .    ASSESSMENT/PLAN:     Estimated Creatinine Clearance: 82.3 mL/min (based on SCr of 0.84 mg/dL).Medications reviewed, no dose adjustments needed. Weekly swing bed medication regimen review complete.  Will continue to monitor.  Ryder Villar, Pharm. D.  Director of Pharmacy  [facility]  [phone]  [email or web address]

## 2022-12-27 NOTE — CONSULTS
"[facility] - Medical Surgical Unit  Adult Nutrition  Consult Note         Reason for Assessment  Reason For Assessment: consult (Assess/educate regarding nutritional needs)  Nutrition Risk Screen: no indicators present    RD consult received and appreciated.    Recommend continue current diet as medically appropriate and tolerated. Monitor need to modify diet to Cardiac Diet order d/t pt with HTN. Encourage good PO intakes.    Per H&P,   "78yo WM with hx of HTN admit s/p R hip surgical site infection getting IV ABX.  [facility].  For some reason he was transferred here to finish required course of ABX.  He denies any CP, SOB, N/V or diarrhea.  Incision looks good."    Per MD note,  "12/26 He is doing well.  Doing some exercises in bed for hip.  On IV ABX and will finish course here.  No complaints."    Patient currently receiving Regular Diet with 100% PO intake documented. Monitor need for Cardiac Diet modification d/t pt with HTN. Encourage good PO intakes.     CBW 94.4kg considered over IBW range, BMI overweight per geriatric guidelines - nutritional needs adjusted.     Last BM 12/22 per flowsheets - pt receiving polyethylene glycol per med list. Will continue to monitor PO intake, weight trend, meds, labs, updates in patient condition. RD following.    Malnutrition  Is Patient Malnourished: No    Nutrition Diagnosis  Decreased nutrient needs of Na, cholesterol, saturated fats  related to Chronic illness as evidenced by pt with HTN    Nutrition Diagnosis Status: Chronic/ continues    Nutrition Risk  Level of Risk/Frequency of Follow-up: low   Chewing or Swallowing Difficulty?: No Chewing or swallowing difficulty    Estimated/Assessed Needs    Temp: 98 °F (36.7 °C)Oral  Weight Used For Calorie Calculations: 80.1 kg (176 lb 8.5 oz) (adjusted body weight)   Energy Need Method: Kcal/kg (25-30 kcal/kg adj body weight) Energy Calorie Requirements (kcal): 2002-2402 " kcal  Weight Used For Protein Calculations: 80.1 kg (176 lb 8.5 oz) (adjusted body weight)  Protein Requirements: 80-96g pro (1.0-1.2g pro/kg adj body weight)  Estimated Fluid Requirement Method: RDA Method    RDA Method (mL): 2002     Nutrition Prescription / Recommendations  Recommendation/Intervention: Recommend continue current diet as medically appropriate and tolerated. Monitor need to modify diet to Cardiac Diet order d/t pt with HTN. Encourage good PO intakes.  Goals: PO intake >75%, weight maintenance  Nutrition Goal Status: new  Current Diet Order: Regular Diet  Nutrition Order Comments: Monitor need for Cardiac Diet as pt with HTN  Recommended Diet:  Cardiac Diet  Recommended Oral Supplement: No Oral Supplements  Is Nutrition Support Recommended: No  Is Education Recommended: No    Monitor and Evaluation  % current Intake: Other: 100%  % intake to meet estimated needs: 75 - 100 %  Food and Nutrient Intake: energy intake, food and beverage intake  Food and Nutrient Adminstration: diet order  Knowledge/Beliefs/Attitudes: food and nutrition knowledge/skill, beliefs and attitudes  Physical Activity and Function: nutrition-related ADLs and IADLs, factors affecting access to physical activity  Anthropometric Measurements: weight, weight change, body mass index  Biochemical Data, Medical Tests and Procedures: electrolyte and renal panel, gastrointestinal profile, glucose/endocrine profile, inflammatory profile, lipid profile  Nutrition-Focused Physical Findings: overall appearance, extremities, muscles and bones, head and eyes, skin     Current Medical Diagnosis and Past Medical History  Diagnosis: infection/sepsis (infection associated with surgery)  No past medical history on file.    Nutrition/Diet History  Spiritual, Cultural Beliefs, Episcopal Practices, Values that Affect Care: no  Food Allergies: NKFA    Lab/Procedures/Meds  No results for input(s): NA, K, BUN, CREATININE, GLU, CALCIUM, ALBUMIN, CL, ALT,  "AST, PHOS in the last 72 hours.  Last A1c:   Lab Results   Component Value Date    HGBA1C 6.0 12/24/2022     Lab Results   Component Value Date    RBC 3.19 (L) 12/24/2022    HGB 10.6 (L) 12/24/2022    HCT 32.5 (L) 12/24/2022    .9 (H) 12/24/2022    MCH 33.2 (H) 12/24/2022    MCHC 32.6 12/24/2022     Pertinent Labs Reviewed: reviewed  Ca 8.4 (L) - replete to WNL as needed    Pertinent Medications Reviewed: reviewed  Amlodipine, omnipen, cetirizine, enoxaparin, ergocalciferol, ferrous sulfate, hydrocortisone, polyethylene glycol, acetaminophen PRN     Anthropometrics  Temp: 98 °F (36.7 °C)  Height Method: Stated  Height: 5' 10" (177.8 cm)  Height (inches): 70 in  Weight Method: Bed Scale  Weight: 94.4 kg (208 lb 1.6 oz)  Weight (lb): 208.1 lb  Ideal Body Weight (IBW), Male: 166 lb  % Ideal Body Weight, Male (lb): 125.36 %  BMI (Calculated): 29.9     Nutrition by Nursing  Diet/Nutrition Received: regular  Intake (%): 100%  Diet/Feeding Assistance: none  Diet/Feeding Tolerance: good  Last Bowel Movement: 12/22/22    Nutrition Follow-Up  RD Follow-up?: Yes  "

## 2022-12-27 NOTE — PT/OT/SLP PROGRESS
Physical Therapy Treatment    Patient Name:  Bradley Mcneil   MRN:  [id number]    Recommendations:     Discharge Recommendations: home health PT  Discharge Equipment Recommendations: none  Barriers to discharge: Inaccessible home, Decreased caregiver support, and ABT    Assessment:     Bradley Mcneil is a 77 y.o. male admitted with a medical diagnosis of Infection associated with surgery.  He presents with the following impairments/functional limitations: weakness, impaired balance, pain, decreased ROM. Patient required visual and verbal cues for proper form of therapy tasks. Patient with good endurance this date during treatment. Patient required min assistance for donning pants and jacket, and doffing jacket. Patient stated that his R hip pain was a 7/10 after treatment, but declined ice at the time.      Rehab Prognosis: Good; patient would benefit from acute skilled PT services to address these deficits and reach maximum level of function.    Recent Surgery: * No surgery found *      Plan:     Supervisory visit completed with Dominique Hernandez PT discussing patient's POC and goals.     During this hospitalization, patient to be seen 5 x/week to address the identified rehab impairments via gait training, therapeutic activities, therapeutic exercises and progress toward the following goals:    Plan of Care Expires:  01/06/23    Subjective     Chief Complaint: R hip pain  Patient/Family Comments/goals: DC to his home.   Pain/Comfort:  Pain Rating 1: 6/10  Location - Side 1: Right  Location - Orientation 1: generalized  Location 1: hip  Pain Addressed 1: Pre-medicate for activity      Objective:     Communicated with RN prior to session.  Patient found HOB elevated with PICC line upon PTA entry to room.     General Precautions: Standard, fall  Orthopedic Precautions: RLE weight bearing as tolerated  Braces:    Respiratory Status: Room air     Functional Mobility:  Bed Mobility:     Supine to Sit:  supervision  Transfers:     Sit to Stand:  stand by assistance with rolling walker  Gait: Patient ambulated 215 feet + 340 feet with RW and SBA  Balance: Patient performed dyanmic sitting and standing balance for dressing.      AM-PAC 6 CLICK MOBILITY  Turning over in bed (including adjusting bedclothes, sheets and blankets)?: 4  Sitting down on and standing up from a chair with arms (e.g., wheelchair, bedside commode, etc.): 4  Moving from lying on back to sitting on the side of the bed?: 4  Moving to and from a bed to a chair (including a wheelchair)?: 4  Need to walk in hospital room?: 4  Climbing 3-5 steps with a railing?:  ((Not perfomred today))       Treatment & Education:  Patient performed the following standing exercises 10x each: Marching BLE, HS curls RLE, Hip ABD RLE, Hip ext RLE, Heel raises BLE, Mini squats BLE, Step up on 6 in step leading with RLE   Nutsep 10 minutes     Patient left sitting edge of bed with call button in reach.    GOALS:   Multidisciplinary Problems       Physical Therapy Goals          Problem: Physical Therapy    Goal Priority Disciplines Outcome Goal Variances Interventions   Physical Therapy Goal     PT, PT/OT Ongoing, Progressing     Description: LTG - 2 weeks  1. Pt's Tinetti Balance score will be at least 18/28 points using RW or more appropriate AD.  2. Pt's RLE strength will be at least 4/5.  3. Pt will gain 5-10 degrees ROM right hip.  4. Pt will negotiate stairs with SBA.  5. Pt will be assessed for progression to SC or QC for ambulation.                       Time Tracking:     PT Received On: 12/27/22  PT Start Time: 1340     PT Stop Time: 1425  PT Total Time (min): 45 min     Billable Minutes: Gait Training 10, Therapeutic Activity 5, and Therapeutic Exercise 30    Treatment Type: Treatment  PT/PTA: PTA     PTA Visit Number: 1     12/27/2022

## 2022-12-27 NOTE — PLAN OF CARE
Problem: Adult Inpatient Plan of Care  Goal: Patient-Specific Goal (Individualized)  Outcome: Ongoing, Progressing  Goal: Absence of Hospital-Acquired Illness or Injury  Outcome: Ongoing, Progressing  Goal: Optimal Comfort and Wellbeing  Outcome: Ongoing, Progressing  Goal: Readiness for Transition of Care  Outcome: Ongoing, Progressing     Problem: Adult Inpatient Plan of Care  Goal: Absence of Hospital-Acquired Illness or Injury  Outcome: Ongoing, Progressing     Problem: Adult Inpatient Plan of Care  Goal: Optimal Comfort and Wellbeing  Outcome: Ongoing, Progressing     Problem: Adult Inpatient Plan of Care  Goal: Readiness for Transition of Care  Outcome: Ongoing, Progressing

## 2022-12-27 NOTE — PT/OT/SLP EVAL
ST Screen    ST screen only at this time. No skilled ST services warranted at this time, reconsult ST upon change in status.     Sima Joel M.S. LISA-SLP

## 2022-12-28 PROCEDURE — 27000958

## 2022-12-28 PROCEDURE — 97116 GAIT TRAINING THERAPY: CPT | Mod: CQ

## 2022-12-28 PROCEDURE — 99900035 HC TECH TIME PER 15 MIN (STAT)

## 2022-12-28 PROCEDURE — 11000004 HC SNF PRIVATE

## 2022-12-28 PROCEDURE — 97110 THERAPEUTIC EXERCISES: CPT

## 2022-12-28 PROCEDURE — 25000003 PHARM REV CODE 250

## 2022-12-28 PROCEDURE — 63600175 PHARM REV CODE 636 W HCPCS

## 2022-12-28 RX ADMIN — ANTIPRURITIC (ANTI-ITCH): 1 CREAM TOPICAL at 08:12

## 2022-12-28 RX ADMIN — POLYETHYLENE GLYCOL 3350 17 G: 17 POWDER, FOR SOLUTION ORAL at 08:12

## 2022-12-28 RX ADMIN — CETIRIZINE HYDROCHLORIDE 10 MG: 10 TABLET, FILM COATED ORAL at 08:12

## 2022-12-28 RX ADMIN — AMLODIPINE BESYLATE 10 MG: 10 TABLET ORAL at 08:12

## 2022-12-28 RX ADMIN — AMPICILLIN SODIUM 2 G: 2 INJECTION, POWDER, FOR SOLUTION INTRAMUSCULAR; INTRAVENOUS at 12:12

## 2022-12-28 RX ADMIN — ACETAMINOPHEN 650 MG: 325 TABLET ORAL at 11:12

## 2022-12-28 RX ADMIN — AMPICILLIN SODIUM 2 G: 2 INJECTION, POWDER, FOR SOLUTION INTRAMUSCULAR; INTRAVENOUS at 11:12

## 2022-12-28 RX ADMIN — ACETAMINOPHEN 650 MG: 325 TABLET ORAL at 01:12

## 2022-12-28 RX ADMIN — FERROUS SULFATE TAB 325 MG (65 MG ELEMENTAL FE) 1 EACH: 325 (65 FE) TAB at 08:12

## 2022-12-28 RX ADMIN — AMPICILLIN SODIUM 2 G: 2 INJECTION, POWDER, FOR SOLUTION INTRAMUSCULAR; INTRAVENOUS at 08:12

## 2022-12-28 RX ADMIN — AMPICILLIN SODIUM 2 G: 2 INJECTION, POWDER, FOR SOLUTION INTRAMUSCULAR; INTRAVENOUS at 04:12

## 2022-12-28 RX ADMIN — ENOXAPARIN SODIUM 40 MG: 100 INJECTION SUBCUTANEOUS at 04:12

## 2022-12-28 RX ADMIN — OXYCODONE AND ACETAMINOPHEN 1 TABLET: 325; 5 TABLET ORAL at 12:12

## 2022-12-28 NOTE — PLAN OF CARE
Problem: Impaired Wound Healing  Goal: Optimal Wound Healing  Outcome: Ongoing, Progressing  Intervention: Promote Wound Healing  Flowsheets (Taken 12/28/2022 1532)  Oral Nutrition Promotion: calorie-dense foods provided  Activity Management: Ambulated -L4   Plan of care reviewed with patient. Patients status ongoing progressing.

## 2022-12-28 NOTE — PLAN OF CARE
12/27/22 1148   Discharge Assessment   Assessment Type Discharge Planning Assessment   Source of Information patient   Communicated RUFINO with patient/caregiver Yes   Reason For Admission IV abx   People in Home alone   Facility Arrived From: [facility]   Do you expect to return to your current living situation? Yes   Prior to hospitilization cognitive status: Alert/Oriented   Current cognitive status: Alert/Oriented   Walking or Climbing Stairs ambulation difficulty, requires equipment   Home Layout Able to live on 1st floor   Equipment Currently Used at Home rollator;bedside commode;cane, straight;grab bar   Readmission within 30 days? No   Patient currently being followed by outpatient case management? No   Do you take prescription medications? Yes   Do you have prescription coverage? Yes   Do you have any problems affording any of your prescribed medications? No   Is the patient taking medications as prescribed? yes   How do you get to doctors appointments? car, drives self;family or friend will provide   Are you on dialysis? No   Do you take coumadin? No   Discharge Plan A Home   DME Needed Upon Discharge  none   Discharge Barriers Identified None   Physical Activity   On average, how many days per week do you engage in moderate to strenuous exercise (like a brisk walk)? 0 days   On average, how many minutes do you engage in exercise at this level? 0 min   Financial Resource Strain   How hard is it for you to pay for the very basics like food, housing, medical care, and heating? Not hard   Housing Stability   In the last 12 months, was there a time when you were not able to pay the mortgage or rent on time? N   In the last 12 months, how many places have you lived? 1   In the last 12 months, was there a time when you did not have a steady place to sleep or slept in a shelter (including now)? N   Transportation Needs   In the past 12 months, has lack of transportation kept you from medical appointments  or from getting medications? no   In the past 12 months, has lack of transportation kept you from meetings, work, or from getting things needed for daily living? No   Food Insecurity   Within the past 12 months, you worried that your food would run out before you got the money to buy more. Never true   Within the past 12 months, the food you bought just didn't last and you didn't have money to get more. Never true   Stress   Do you feel stress - tense, restless, nervous, or anxious, or unable to sleep at night because your mind is troubled all the time - these days? Only a littl   Social Connections   In a typical week, how many times do you talk on the phone with family, friends, or neighbors? More than 3   How often do you get together with friends or relatives? Twice   How often do you attend Confucianist or Yazidi services? 1 to 4   Do you belong to any clubs or organizations such as Confucianist groups, unions, fraternal or athletic groups, or school groups? No   How often do you attend meetings of the clubs or organizations you belong to? Never   Are you , , , , never , or living with a partner?    Alcohol Use   Q1: How often do you have a drink containing alcohol? Never   Q2: How many drinks containing alcohol do you have on a typical day when you are drinking? None   Q3: How often do you have six or more drinks on one occasion? Never     Patient arrived to [facility] to complete IV abx treatment. Patient reports he lives in his home alone and is independent with his care. Denies questions/concerns at time of assessment. Will continue to follow

## 2022-12-28 NOTE — NURSING
Nurses Note -- 4 Eyes      12/28/2022   7:31 AM      Skin assessed during: Q Shift Change      [x] No Pressure Injuries Present    []Prevention Measures Documented      [] Yes- Altered Skin Integrity Present or Discovered   [] LDA Added if Not in Epic (Describe Wound)   [] New Altered Skin Integrity was Present on Admit and Documented in LDA   [] Wound Image Taken    Wound Care Consulted? No    Attending Nurse:  TYLER ARRIETA RN     Second RN/Staff Member: CECI Cordova

## 2022-12-28 NOTE — PT/OT/SLP PROGRESS
Physical Therapy Treatment    Patient Name:  Bradley Mcneil   MRN:  [id number]    Recommendations:     Discharge Recommendations: home health PT  Discharge Equipment Recommendations: none  Barriers to discharge: Inaccessible home, Decreased caregiver support, and ABT    Assessment:     Bradley Mcneil is a 77 y.o. male admitted with a medical diagnosis of Infection associated with surgery.  He presents with the following impairments/functional limitations: weakness, impaired balance, pain, decreased ROM. PTA continued treatment with patient due to PT, Dominique Hernandez, having to stop treatment early due to a meeting. Patient required visual and verbal cues for proper sequencing during stair training. Patient able to ambulate with SBA with SC this date. Patient required min assistance for doffing jacket. Patient stated that his R hip felt better after completion of treatment.     Rehab Prognosis: Good; patient would benefit from acute skilled PT services to address these deficits and reach maximum level of function.    Recent Surgery: * No surgery found *      Plan:     During this hospitalization, patient to be seen 5 x/week to address the identified rehab impairments via gait training, therapeutic activities, therapeutic exercises and progress toward the following goals:    Plan of Care Expires:  01/06/23    Subjective     Chief Complaint: R hip pain and L wrist pain.   Patient/Family Comments/goals: DC to his home.   Pain/Comfort:  Pain Rating 1: 8/10  Location - Side 1: Right  Location - Orientation 1: generalized  Location 1: hip  Pain Addressed 1: Pre-medicate for activity  Pain Rating 2: 8/10  Location - Side 2: Left  Location - Orientation 2: generalized  Location 2: wrist  Pain Addressed 2: Pre-medicate for activity      Objective:     Communicated with supervising PT prior to session.  Patient found sitting edge of bed with PICC line upon PTA entry to room.     General Precautions: Standard, fall  Orthopedic  Precautions: RLE weight bearing as tolerated  Braces:    Respiratory Status: Room air     Functional Mobility:  Transfers:     Sit to Stand:  supervision with rolling walker  Gait: Patient ambulated 485 feet with RW + 340 feet with SC and SBA  Balance: Patient performed dyanmic sitting balance for dressing.  Stairs:  Pt ascended/descended 2 trials of 5 stair(s) with hand held assistance with left handrail with Contact Guard Assistance.       AM-PAC 6 CLICK MOBILITY  Turning over in bed (including adjusting bedclothes, sheets and blankets)?: 4  Sitting down on and standing up from a chair with arms (e.g., wheelchair, bedside commode, etc.): 4  Moving from lying on back to sitting on the side of the bed?: 4  Moving to and from a bed to a chair (including a wheelchair)?: 4  Need to walk in hospital room?: 4  Climbing 3-5 steps with a railing?: 3  Basic Mobility Total Score: 23       Treatment & Education:  Patient performed mobility above.    Patient left sitting edge of bed with call button in reach.    GOALS:   Multidisciplinary Problems       Physical Therapy Goals          Problem: Physical Therapy    Goal Priority Disciplines Outcome Goal Variances Interventions   Physical Therapy Goal     PT, PT/OT Ongoing, Progressing     Description: LTG - 2 weeks  1. Pt's Tinetti Balance score will be at least 18/28 points using RW or more appropriate AD.  2. Pt's RLE strength will be at least 4/5.  3. Pt will gain 5-10 degrees ROM right hip.  4. Pt will negotiate stairs with SBA.  5. Pt will be assessed for progression to SC or QC for ambulation.                       Time Tracking:     PT Received On: 12/28/22  PT Start Time: 1419     PT Stop Time: 1444  PT Total Time (min): 25 min     Billable Minutes: Gait Training 23 and Therapeutic Activity 2    Treatment Type: Treatment  PT/PTA: PTA     PTA Visit Number: 2     12/28/2022

## 2022-12-28 NOTE — PLAN OF CARE
Problem: Adult Inpatient Plan of Care  Goal: Plan of Care Review  Outcome: Ongoing, Progressing  Goal: Patient-Specific Goal (Individualized)  Outcome: Ongoing, Progressing  Goal: Absence of Hospital-Acquired Illness or Injury  Outcome: Ongoing, Progressing  Goal: Optimal Comfort and Wellbeing  Outcome: Ongoing, Progressing     Problem: Fall Injury Risk  Goal: Absence of Fall and Fall-Related Injury  Outcome: Ongoing, Progressing     Problem: Infection  Goal: Absence of Infection Signs and Symptoms  Outcome: Ongoing, Progressing     Problem: Breathing Pattern Ineffective  Goal: Effective Breathing Pattern  Outcome: Ongoing, Progressing     Problem: Impaired Wound Healing  Goal: Optimal Wound Healing  Outcome: Ongoing, Progressing

## 2022-12-29 PROCEDURE — 63600175 PHARM REV CODE 636 W HCPCS

## 2022-12-29 PROCEDURE — 27000958

## 2022-12-29 PROCEDURE — 97116 GAIT TRAINING THERAPY: CPT | Mod: CQ

## 2022-12-29 PROCEDURE — 25000003 PHARM REV CODE 250

## 2022-12-29 PROCEDURE — 97110 THERAPEUTIC EXERCISES: CPT | Mod: CQ

## 2022-12-29 PROCEDURE — 11000004 HC SNF PRIVATE

## 2022-12-29 RX ADMIN — OXYCODONE AND ACETAMINOPHEN 1 TABLET: 325; 5 TABLET ORAL at 02:12

## 2022-12-29 RX ADMIN — ANTIPRURITIC (ANTI-ITCH): 1 CREAM TOPICAL at 09:12

## 2022-12-29 RX ADMIN — AMLODIPINE BESYLATE 10 MG: 10 TABLET ORAL at 08:12

## 2022-12-29 RX ADMIN — ACETAMINOPHEN 650 MG: 325 TABLET ORAL at 06:12

## 2022-12-29 RX ADMIN — AMPICILLIN SODIUM 2 G: 2 INJECTION, POWDER, FOR SOLUTION INTRAMUSCULAR; INTRAVENOUS at 12:12

## 2022-12-29 RX ADMIN — AMPICILLIN SODIUM 2 G: 2 INJECTION, POWDER, FOR SOLUTION INTRAMUSCULAR; INTRAVENOUS at 08:12

## 2022-12-29 RX ADMIN — OXYCODONE AND ACETAMINOPHEN 1 TABLET: 325; 5 TABLET ORAL at 08:12

## 2022-12-29 RX ADMIN — ENOXAPARIN SODIUM 40 MG: 100 INJECTION SUBCUTANEOUS at 04:12

## 2022-12-29 RX ADMIN — FERROUS SULFATE TAB 325 MG (65 MG ELEMENTAL FE) 1 EACH: 325 (65 FE) TAB at 08:12

## 2022-12-29 RX ADMIN — CETIRIZINE HYDROCHLORIDE 10 MG: 10 TABLET, FILM COATED ORAL at 08:12

## 2022-12-29 RX ADMIN — AMPICILLIN SODIUM 2 G: 2 INJECTION, POWDER, FOR SOLUTION INTRAMUSCULAR; INTRAVENOUS at 04:12

## 2022-12-29 RX ADMIN — ANTIPRURITIC (ANTI-ITCH): 1 CREAM TOPICAL at 12:12

## 2022-12-29 RX ADMIN — OXYCODONE AND ACETAMINOPHEN 1 TABLET: 325; 5 TABLET ORAL at 01:12

## 2022-12-29 RX ADMIN — OXYCODONE AND ACETAMINOPHEN 1 TABLET: 325; 5 TABLET ORAL at 10:12

## 2022-12-29 RX ADMIN — POLYETHYLENE GLYCOL 3350 17 G: 17 POWDER, FOR SOLUTION ORAL at 08:12

## 2022-12-29 RX ADMIN — AMPICILLIN SODIUM 2 G: 2 INJECTION, POWDER, FOR SOLUTION INTRAMUSCULAR; INTRAVENOUS at 11:12

## 2022-12-29 NOTE — NURSING
Nurses Note -- 4 Eyes      12/29/2022   7:33 AM      Skin assessed during: Q Shift Change      [x] No Pressure Injuries Present    []Prevention Measures Documented      [] Yes- Altered Skin Integrity Present or Discovered   [] LDA Added if Not in Epic (Describe Wound)   [] New Altered Skin Integrity was Present on Admit and Documented in LDA   [] Wound Image Taken    Wound Care Consulted? No    Attending Nurse:  TYLER ARRIETA RN     Second RN/Staff Member:  ANNETTE Harvey

## 2022-12-29 NOTE — PLAN OF CARE
Problem: Pain Acute  Goal: Acceptable Pain Control and Functional Ability  Outcome: Ongoing, Progressing  Intervention: Develop Pain Management Plan  Flowsheets (Taken 12/29/2022 1737)  Pain Management Interventions: breathing exercises utilized  Intervention: Prevent or Manage Pain  Flowsheets (Taken 12/29/2022 1737)  Bowel Elimination Promotion: adequate fluid intake promoted  Medication Review/Management: medications reviewed   Plan of care reviewed with patient. Patients status ongoing progressing.

## 2022-12-29 NOTE — PT/OT/SLP PROGRESS
Physical Therapy Treatment    Patient Name:  Bradley Mcneil   MRN:  [id number]    Recommendations:     Discharge Recommendations: home health PT  Discharge Equipment Recommendations: none  Barriers to discharge: Inaccessible home, Decreased caregiver support, and ABT    Assessment:     Bradley Mcneil is a 77 y.o. male admitted with a medical diagnosis of Infection associated with surgery.  He presents with the following impairments/functional limitations: gait instability, impaired balance, weakness, pain, decreased ROM. Patient ambulated with RW to decrease use of L wrist due to reports of increased pain. Patient able to ascend and descend stairs with SBA this date. After completion of treatment patient stated that he had no change in hip pain.    Rehab Prognosis: Good; patient would benefit from acute skilled PT services to address these deficits and reach maximum level of function.    Recent Surgery: * No surgery found *      Plan:     During this hospitalization, patient to be seen 5 x/week to address the identified rehab impairments via gait training, therapeutic activities, therapeutic exercises and progress toward the following goals:    Plan of Care Expires:  01/06/23    Subjective     Chief Complaint: R hip pain and L wrist pain.   Patient/Family Comments/goals: DC to his home.   Pain/Comfort:  Pain Rating 1: 6/10 (Pt did not give pain rating.)  Location - Side 1: Right  Location - Orientation 1: generalized  Location 1: hip  Pain Addressed 1: Pre-medicate for activity, Reposition  Pain Rating Post-Intervention 1: 6/10  Pain Rating 2: 8/10  Location - Side 2: Left  Location - Orientation 2: generalized  Location 2: wrist  Pain Addressed 2: Pre-medicate for activity      Objective:     Communicated with nursing staff prior to session.  Patient found left sidelying with PICC line upon PTA entry to room.     General Precautions: Standard, fall  Orthopedic Precautions: RLE weight bearing as tolerated  Braces:     Respiratory Status: Room air     Functional Mobility:  Bed Mobility:     Supine to Sit: supervision  Transfers:     Sit to Stand:  supervision with rolling walker  Gait: Patient ambulated 485 feet with RW + 90 feet with RW and SBA  Balance: Patient performed dyanmic sitting balance for dressing and required some assistance for donning and doffing due to L wrist pain and PICC line.  Stairs:  Pt ascended/descended  5 stair(s) with No Assistive Device with Hand rail in R hand with Stand-by Assistance.       AM-PAC 6 CLICK MOBILITY  Turning over in bed (including adjusting bedclothes, sheets and blankets)?: 4  Sitting down on and standing up from a chair with arms (e.g., wheelchair, bedside commode, etc.): 4  Moving from lying on back to sitting on the side of the bed?: 4  Moving to and from a bed to a chair (including a wheelchair)?: 4  Need to walk in hospital room?: 4  Climbing 3-5 steps with a railing?: 4  Basic Mobility Total Score: 24       Treatment & Education:  Patient performed Nustep on L 2 or 5 minutes, and standing: marching 10x, heel raises 10x, and RLE hip ABD 10x    Patient left sitting edge of bed with call button in reach.    GOALS:   Multidisciplinary Problems       Physical Therapy Goals          Problem: Physical Therapy    Goal Priority Disciplines Outcome Goal Variances Interventions   Physical Therapy Goal     PT, PT/OT Ongoing, Progressing     Description: LTG - 2 weeks  1. Pt's Tinetti Balance score will be at least 18/28 points using RW or more appropriate AD.  2. Pt's RLE strength will be at least 4/5.  3. Pt will gain 5-10 degrees ROM right hip.  4. Pt will negotiate stairs with SBA.  5. Pt will be assessed for progression to SC or QC for ambulation.                       Time Tracking:     PT Received On: 12/29/22  PT Start Time: 1332     PT Stop Time: 1401  PT Total Time (min): 29 min     Billable Minutes: Gait Training 15, Therapeutic Activity 2, and Therapeutic Exercise  12    Treatment Type: Treatment  PT/PTA: PTA     PTA Visit Number: 2     12/29/2022

## 2022-12-29 NOTE — NURSING
Nurses Note -- 4 Eyes      12/29/2022   4:30 AM      Skin assessed during: Q Shift Change      [x] No Pressure Injuries Present    []Prevention Measures Documented      [] Yes- Altered Skin Integrity Present or Discovered   [] LDA Added if Not in Epic (Describe Wound)   [] New Altered Skin Integrity was Present on Admit and Documented in LDA   [] Wound Image Taken    Wound Care Consulted? No    Attending Nurse:  Zoraida Harvey RN     Second RN/Staff Member:  MO French RN

## 2022-12-30 LAB
ERYTHROCYTE [SEDIMENTATION RATE] IN BLOOD BY WESTERGREN METHOD: 82 MM/HR (ref 0–20)
URATE SERPL-MCNC: 5.9 MG/DL (ref 3.5–7.2)

## 2022-12-30 PROCEDURE — 99900035 HC TECH TIME PER 15 MIN (STAT)

## 2022-12-30 PROCEDURE — 27000958

## 2022-12-30 PROCEDURE — 84550 ASSAY OF BLOOD/URIC ACID: CPT | Performed by: HOSPITALIST

## 2022-12-30 PROCEDURE — 11000004 HC SNF PRIVATE

## 2022-12-30 PROCEDURE — 97116 GAIT TRAINING THERAPY: CPT | Mod: CQ

## 2022-12-30 PROCEDURE — 97110 THERAPEUTIC EXERCISES: CPT | Mod: CQ

## 2022-12-30 PROCEDURE — 85651 RBC SED RATE NONAUTOMATED: CPT | Performed by: HOSPITALIST

## 2022-12-30 PROCEDURE — 36415 COLL VENOUS BLD VENIPUNCTURE: CPT | Performed by: HOSPITALIST

## 2022-12-30 PROCEDURE — 63600175 PHARM REV CODE 636 W HCPCS

## 2022-12-30 PROCEDURE — 25000003 PHARM REV CODE 250

## 2022-12-30 RX ADMIN — AMPICILLIN SODIUM 2 G: 2 INJECTION, POWDER, FOR SOLUTION INTRAMUSCULAR; INTRAVENOUS at 04:12

## 2022-12-30 RX ADMIN — AMPICILLIN SODIUM 2 G: 2 INJECTION, POWDER, FOR SOLUTION INTRAMUSCULAR; INTRAVENOUS at 11:12

## 2022-12-30 RX ADMIN — OXYCODONE AND ACETAMINOPHEN 1 TABLET: 325; 5 TABLET ORAL at 04:12

## 2022-12-30 RX ADMIN — OXYCODONE AND ACETAMINOPHEN 1 TABLET: 325; 5 TABLET ORAL at 08:12

## 2022-12-30 RX ADMIN — AMPICILLIN SODIUM 2 G: 2 INJECTION, POWDER, FOR SOLUTION INTRAMUSCULAR; INTRAVENOUS at 08:12

## 2022-12-30 RX ADMIN — ENOXAPARIN SODIUM 40 MG: 100 INJECTION SUBCUTANEOUS at 04:12

## 2022-12-30 RX ADMIN — ANTIPRURITIC (ANTI-ITCH): 1 CREAM TOPICAL at 09:12

## 2022-12-30 RX ADMIN — POLYETHYLENE GLYCOL 3350 17 G: 17 POWDER, FOR SOLUTION ORAL at 08:12

## 2022-12-30 RX ADMIN — AMLODIPINE BESYLATE 10 MG: 10 TABLET ORAL at 08:12

## 2022-12-30 RX ADMIN — CETIRIZINE HYDROCHLORIDE 10 MG: 10 TABLET, FILM COATED ORAL at 09:12

## 2022-12-30 RX ADMIN — FERROUS SULFATE TAB 325 MG (65 MG ELEMENTAL FE) 1 EACH: 325 (65 FE) TAB at 08:12

## 2022-12-30 RX ADMIN — ACETAMINOPHEN 650 MG: 325 TABLET ORAL at 04:12

## 2022-12-30 NOTE — NURSING
Nurses Note -- 4 Eyes      12/30/2022   7:24 AM      Skin assessed during: Q Shift Change      [x] No Pressure Injuries Present    []Prevention Measures Documented      [] Yes- Altered Skin Integrity Present or Discovered   [] LDA Added if Not in Epic (Describe Wound)   [] New Altered Skin Integrity was Present on Admit and Documented in LDA   [] Wound Image Taken    Wound Care Consulted? No    Attending Nurse:  TYLER ARRIETA RN     Second RN/Staff Member:  ANNETTE Harvey

## 2022-12-30 NOTE — PLAN OF CARE
Problem: Physical Therapy  Goal: Physical Therapy Goal  Description: LTG - 2 weeks  1. Pt's Tinetti Balance score will be at least 18/28 points using RW or more appropriate AD.-PROGRESSING  2. Pt's RLE strength will be at least 4/5.-PROGRESSING  3. Pt will gain 5-10 degrees ROM right hip.-MET  4. Pt will negotiate stairs with SBA.-MET  5. Pt will be assessed for progression to SC or QC for ambulation.-PROGRESSING  Outcome: Ongoing, Progressing

## 2022-12-30 NOTE — PLAN OF CARE
Problem: Pain Acute  Goal: Acceptable Pain Control and Functional Ability  Outcome: Ongoing, Progressing  Intervention: Develop Pain Management Plan  Flowsheets (Taken 12/29/2022 1737)  Pain Management Interventions: breathing exercises utilized  Intervention: Prevent or Manage Pain  Flowsheets (Taken 12/29/2022 1737)  Bowel Elimination Promotion: adequate fluid intake promoted  Medication Review/Management: medications reviewed     Problem: Impaired Wound Healing  Goal: Optimal Wound Healing  Outcome: Ongoing, Progressing  Intervention: Promote Wound Healing  Flowsheets (Taken 12/30/2022 2159)  Oral Nutrition Promotion: calorie-dense foods provided  Sleep/Rest Enhancement: awakenings minimized  Activity Management: Ambulated -L4  Pain Management Interventions: medication offered   Plan of care reviewed with patient. Patients status ongoing progressing.

## 2022-12-30 NOTE — PT/OT/SLP PROGRESS
Physical Therapy Treatment    Patient Name:  Bradley Mcneil   MRN:  [id number]    Recommendations:     Discharge Recommendations: home health PT  Discharge Equipment Recommendations: none  Barriers to discharge: Inaccessible home, Decreased caregiver support, and ABT    Assessment:     Bradley Mcneil is a 77 y.o. male admitted with a medical diagnosis of Infection associated with surgery.  He presents with the following impairments/functional limitations: gait instability, impaired balance, weakness, pain, decreased ROM. PTA provided patient with cues to correct form of therapy tasks. Patient ambulated with RW again this date to decrease use of L wrist due to reports of increased pain. Patient with no reports of increased pain after completion of treatment.   Rehab Prognosis: Good; patient would benefit from acute skilled PT services to address these deficits and reach maximum level of function.    Recent Surgery: * No surgery found *      Plan:     During this hospitalization, patient to be seen 5 x/week to address the identified rehab impairments via gait training, therapeutic activities, therapeutic exercises and progress toward the following goals:    Plan of Care Expires:  01/06/23    Subjective     Chief Complaint: L wrist pain.   Patient/Family Comments/goals: DC to his home.   Pain/Comfort:  Pain Rating 1: 8/10  Location - Side 1: Left  Location - Orientation 1: generalized  Location 1: wrist  Pain Addressed 1: Pre-medicate for activity      Objective:     Communicated with supervising PT prior to session.  Patient found sitting edge of bed with PICC line upon PTA entry to room.     General Precautions: Standard, fall  Orthopedic Precautions: RLE weight bearing as tolerated  Braces:    Respiratory Status: Room air     Functional Mobility:  Transfers:     Sit to Stand:  supervision with rolling walker  Gait: Patient ambulated 480 feet with RW + 340 feet with RW and SBA  Balance: Patient performed dyanmic  sitting balance for dressing and required some assistance for donning and doffing due to L wrist pain and PICC line.      AM-PAC 6 CLICK MOBILITY  Turning over in bed (including adjusting bedclothes, sheets and blankets)?: 4  Sitting down on and standing up from a chair with arms (e.g., wheelchair, bedside commode, etc.): 4  Moving from lying on back to sitting on the side of the bed?: 4  Moving to and from a bed to a chair (including a wheelchair)?: 4  Need to walk in hospital room?: 4  Climbing 3-5 steps with a railing?: 4  Basic Mobility Total Score: 24       Treatment & Education:  Patient performed Nustep on L 2 or 10 minutes, and standing: marching 15x, heel raises 15x, and RLE hip ABD 15x, mini squats 15x, Step ups on 6 in step leading with RLE 5 x 2    Patient left sitting edge of bed with call button in reach.    GOALS:   Multidisciplinary Problems       Physical Therapy Goals          Problem: Physical Therapy    Goal Priority Disciplines Outcome Goal Variances Interventions   Physical Therapy Goal     PT, PT/OT Ongoing, Progressing     Description: LTG - 2 weeks  1. Pt's Tinetti Balance score will be at least 18/28 points using RW or more appropriate AD.-PROGRESSING  2. Pt's RLE strength will be at least 4/5.-PROGRESSING  3. Pt will gain 5-10 degrees ROM right hip.-MET  4. Pt will negotiate stairs with SBA.-MET  5. Pt will be assessed for progression to SC or QC for ambulation.-PROGRESSING                       Time Tracking:     PT Received On: 12/30/22  PT Start Time: 1250     PT Stop Time: 1329  PT Total Time (min): 39 min     Billable Minutes: Gait Training 12, Therapeutic Activity 1, and Therapeutic Exercise 26    Treatment Type: Treatment  PT/PTA: PTA     PTA Visit Number: 3     12/30/2022

## 2022-12-31 PROCEDURE — 94761 N-INVAS EAR/PLS OXIMETRY MLT: CPT

## 2022-12-31 PROCEDURE — 63600175 PHARM REV CODE 636 W HCPCS

## 2022-12-31 PROCEDURE — 27000958

## 2022-12-31 PROCEDURE — 11000004 HC SNF PRIVATE

## 2022-12-31 PROCEDURE — 25000003 PHARM REV CODE 250

## 2022-12-31 PROCEDURE — 99900035 HC TECH TIME PER 15 MIN (STAT)

## 2022-12-31 RX ADMIN — OXYCODONE AND ACETAMINOPHEN 1 TABLET: 325; 5 TABLET ORAL at 11:12

## 2022-12-31 RX ADMIN — AMPICILLIN SODIUM 2 G: 2 INJECTION, POWDER, FOR SOLUTION INTRAMUSCULAR; INTRAVENOUS at 08:12

## 2022-12-31 RX ADMIN — FERROUS SULFATE TAB 325 MG (65 MG ELEMENTAL FE) 1 EACH: 325 (65 FE) TAB at 08:12

## 2022-12-31 RX ADMIN — AMPICILLIN SODIUM 2 G: 2 INJECTION, POWDER, FOR SOLUTION INTRAMUSCULAR; INTRAVENOUS at 04:12

## 2022-12-31 RX ADMIN — ENOXAPARIN SODIUM 40 MG: 100 INJECTION SUBCUTANEOUS at 04:12

## 2022-12-31 RX ADMIN — CETIRIZINE HYDROCHLORIDE 10 MG: 10 TABLET, FILM COATED ORAL at 08:12

## 2022-12-31 RX ADMIN — OXYCODONE AND ACETAMINOPHEN 1 TABLET: 325; 5 TABLET ORAL at 08:12

## 2022-12-31 RX ADMIN — ACETAMINOPHEN 650 MG: 325 TABLET ORAL at 06:12

## 2022-12-31 RX ADMIN — AMLODIPINE BESYLATE 10 MG: 10 TABLET ORAL at 08:12

## 2022-12-31 RX ADMIN — ACETAMINOPHEN 650 MG: 325 TABLET ORAL at 12:12

## 2022-12-31 RX ADMIN — POLYETHYLENE GLYCOL 3350 17 G: 17 POWDER, FOR SOLUTION ORAL at 08:12

## 2022-12-31 RX ADMIN — AMPICILLIN SODIUM 2 G: 2 INJECTION, POWDER, FOR SOLUTION INTRAMUSCULAR; INTRAVENOUS at 03:12

## 2022-12-31 RX ADMIN — AMPICILLIN SODIUM 2 G: 2 INJECTION, POWDER, FOR SOLUTION INTRAMUSCULAR; INTRAVENOUS at 11:12

## 2022-12-31 RX ADMIN — ANTIPRURITIC (ANTI-ITCH): 1 CREAM TOPICAL at 08:12

## 2022-12-31 NOTE — NURSING
Changed pt's IV tubing with new ones and replaced both curo caps on PICC line.    Quality 137: Melanoma: Continuity Of Care - Recall System: Patient information entered into a recall system that includes: target date for the next exam specified AND a process to follow up with patients regarding missed or unscheduled appointments Quality 130: Documentation Of Current Medications In The Medical Record: Current Medications Documented Detail Level: Detailed Quality 431: Preventive Care And Screening: Unhealthy Alcohol Use - Screening: Patient screened for unhealthy alcohol use using a single question and scores less than 2 times per year Quality 226: Preventive Care And Screening: Tobacco Use: Screening And Cessation Intervention: Patient screened for tobacco use and is an ex/non-smoker

## 2022-12-31 NOTE — PLAN OF CARE
Problem: Adult Inpatient Plan of Care  Goal: Optimal Comfort and Wellbeing  Outcome: Ongoing, Progressing   Patient will be free of pain greater than a 4 by discharge.

## 2022-12-31 NOTE — NURSING
Nurses Note -- 4 Eyes      12/30/2022   10:01 PM      Skin assessed during: Q Shift Change      [] No Pressure Injuries Present    []Prevention Measures Documented      [] Yes- Altered Skin Integrity Present or Discovered   [] LDA Added if Not in Epic (Describe Wound)   [x] New Altered Skin Integrity was Present on Admit and Documented in LDA   [] Wound Image Taken    Wound Care Consulted? No    Attending Nurse:  Zoraida Harvey RN     Second RN/Staff Member:  Rosa French RN

## 2023-01-01 PROCEDURE — 99900035 HC TECH TIME PER 15 MIN (STAT)

## 2023-01-01 PROCEDURE — 27000958

## 2023-01-01 PROCEDURE — 11000004 HC SNF PRIVATE

## 2023-01-01 PROCEDURE — 63600175 PHARM REV CODE 636 W HCPCS

## 2023-01-01 PROCEDURE — 25000003 PHARM REV CODE 250

## 2023-01-01 PROCEDURE — 94761 N-INVAS EAR/PLS OXIMETRY MLT: CPT

## 2023-01-01 RX ADMIN — AMLODIPINE BESYLATE 10 MG: 10 TABLET ORAL at 08:01

## 2023-01-01 RX ADMIN — ENOXAPARIN SODIUM 40 MG: 100 INJECTION SUBCUTANEOUS at 04:01

## 2023-01-01 RX ADMIN — AMPICILLIN SODIUM 2 G: 2 INJECTION, POWDER, FOR SOLUTION INTRAMUSCULAR; INTRAVENOUS at 12:01

## 2023-01-01 RX ADMIN — ACETAMINOPHEN 650 MG: 325 TABLET ORAL at 12:01

## 2023-01-01 RX ADMIN — AMPICILLIN SODIUM 2 G: 2 INJECTION, POWDER, FOR SOLUTION INTRAMUSCULAR; INTRAVENOUS at 11:01

## 2023-01-01 RX ADMIN — FERROUS SULFATE TAB 325 MG (65 MG ELEMENTAL FE) 1 EACH: 325 (65 FE) TAB at 08:01

## 2023-01-01 RX ADMIN — AMPICILLIN SODIUM 2 G: 2 INJECTION, POWDER, FOR SOLUTION INTRAMUSCULAR; INTRAVENOUS at 08:01

## 2023-01-01 RX ADMIN — CETIRIZINE HYDROCHLORIDE 10 MG: 10 TABLET, FILM COATED ORAL at 08:01

## 2023-01-01 RX ADMIN — AMPICILLIN SODIUM 2 G: 2 INJECTION, POWDER, FOR SOLUTION INTRAMUSCULAR; INTRAVENOUS at 04:01

## 2023-01-01 RX ADMIN — ACETAMINOPHEN 650 MG: 325 TABLET ORAL at 07:01

## 2023-01-01 RX ADMIN — POLYETHYLENE GLYCOL 3350 17 G: 17 POWDER, FOR SOLUTION ORAL at 08:01

## 2023-01-01 RX ADMIN — ANTIPRURITIC (ANTI-ITCH): 1 CREAM TOPICAL at 08:01

## 2023-01-01 NOTE — NURSING
Patient complaining of left wrist pain , pretty consistent through out the night . PRN pain med was given at 2100 , provided and ice pack and gave tylenol for pain

## 2023-01-01 NOTE — PLAN OF CARE
Problem: Adult Inpatient Plan of Care  Goal: Optimal Comfort and Wellbeing  Outcome: Ongoing, Progressing     Problem: Fall Injury Risk  Goal: Absence of Fall and Fall-Related Injury  Outcome: Ongoing, Progressing     Problem: Infection  Goal: Absence of Infection Signs and Symptoms  Outcome: Ongoing, Progressing

## 2023-01-02 LAB
ANION GAP SERPL CALCULATED.3IONS-SCNC: 13 MMOL/L (ref 7–16)
BASOPHILS # BLD AUTO: 0.04 K/UL (ref 0–0.2)
BASOPHILS NFR BLD AUTO: 0.5 % (ref 0–1)
BUN SERPL-MCNC: 12 MG/DL (ref 7–18)
BUN/CREAT SERPL: 13 (ref 6–20)
CALCIUM SERPL-MCNC: 8.8 MG/DL (ref 8.5–10.1)
CHLORIDE SERPL-SCNC: 101 MMOL/L (ref 98–107)
CO2 SERPL-SCNC: 27 MMOL/L (ref 21–32)
CREAT SERPL-MCNC: 0.96 MG/DL (ref 0.7–1.3)
DIFFERENTIAL METHOD BLD: ABNORMAL
EGFR (NO RACE VARIABLE) (RUSH/TITUS): 80 ML/MIN/1.73M²
EOSINOPHIL # BLD AUTO: 0.24 K/UL (ref 0–0.5)
EOSINOPHIL NFR BLD AUTO: 2.9 % (ref 1–4)
ERYTHROCYTE [DISTWIDTH] IN BLOOD BY AUTOMATED COUNT: 12.7 % (ref 11.5–14.5)
GLUCOSE SERPL-MCNC: 167 MG/DL (ref 74–106)
HCT VFR BLD AUTO: 39.2 % (ref 40–54)
HGB BLD-MCNC: 13.1 G/DL (ref 13.5–18)
LYMPHOCYTES # BLD AUTO: 1.81 K/UL (ref 1–4.8)
LYMPHOCYTES NFR BLD AUTO: 21.8 % (ref 27–41)
MCH RBC QN AUTO: 33.4 PG (ref 27–31)
MCHC RBC AUTO-ENTMCNC: 33.4 G/DL (ref 32–36)
MCV RBC AUTO: 100 FL (ref 80–96)
MONOCYTES # BLD AUTO: 0.75 K/UL (ref 0–0.8)
MONOCYTES NFR BLD AUTO: 9 % (ref 2–6)
MPC BLD CALC-MCNC: 9 FL (ref 9.4–12.4)
NEUTROPHILS # BLD AUTO: 5.45 K/UL (ref 1.8–7.7)
NEUTROPHILS NFR BLD AUTO: 65.8 % (ref 53–65)
PLATELET # BLD AUTO: 296 K/UL (ref 150–400)
POTASSIUM SERPL-SCNC: 3.9 MMOL/L (ref 3.5–5.1)
RBC # BLD AUTO: 3.92 M/UL (ref 4.6–6.2)
SODIUM SERPL-SCNC: 137 MMOL/L (ref 136–145)
WBC # BLD AUTO: 8.29 K/UL (ref 4.5–11)

## 2023-01-02 PROCEDURE — 97116 GAIT TRAINING THERAPY: CPT | Mod: CQ

## 2023-01-02 PROCEDURE — 97110 THERAPEUTIC EXERCISES: CPT | Mod: CQ

## 2023-01-02 PROCEDURE — 36415 COLL VENOUS BLD VENIPUNCTURE: CPT | Performed by: NURSE PRACTITIONER

## 2023-01-02 PROCEDURE — 27000958

## 2023-01-02 PROCEDURE — 94761 N-INVAS EAR/PLS OXIMETRY MLT: CPT

## 2023-01-02 PROCEDURE — 11000004 HC SNF PRIVATE

## 2023-01-02 PROCEDURE — 99900035 HC TECH TIME PER 15 MIN (STAT)

## 2023-01-02 PROCEDURE — 25000003 PHARM REV CODE 250

## 2023-01-02 PROCEDURE — 80048 BASIC METABOLIC PNL TOTAL CA: CPT | Performed by: NURSE PRACTITIONER

## 2023-01-02 PROCEDURE — 63600175 PHARM REV CODE 636 W HCPCS

## 2023-01-02 PROCEDURE — 85025 COMPLETE CBC W/AUTO DIFF WBC: CPT | Performed by: NURSE PRACTITIONER

## 2023-01-02 RX ADMIN — AMPICILLIN SODIUM 2 G: 2 INJECTION, POWDER, FOR SOLUTION INTRAMUSCULAR; INTRAVENOUS at 04:01

## 2023-01-02 RX ADMIN — ERGOCALCIFEROL 50000 UNITS: 1.25 CAPSULE, LIQUID FILLED ORAL at 08:01

## 2023-01-02 RX ADMIN — OXYCODONE AND ACETAMINOPHEN 1 TABLET: 325; 5 TABLET ORAL at 11:01

## 2023-01-02 RX ADMIN — OXYCODONE AND ACETAMINOPHEN 1 TABLET: 325; 5 TABLET ORAL at 08:01

## 2023-01-02 RX ADMIN — FERROUS SULFATE TAB 325 MG (65 MG ELEMENTAL FE) 1 EACH: 325 (65 FE) TAB at 08:01

## 2023-01-02 RX ADMIN — AMPICILLIN SODIUM 2 G: 2 INJECTION, POWDER, FOR SOLUTION INTRAMUSCULAR; INTRAVENOUS at 11:01

## 2023-01-02 RX ADMIN — CETIRIZINE HYDROCHLORIDE 10 MG: 10 TABLET, FILM COATED ORAL at 08:01

## 2023-01-02 RX ADMIN — ANTIPRURITIC (ANTI-ITCH): 1 CREAM TOPICAL at 09:01

## 2023-01-02 RX ADMIN — AMLODIPINE BESYLATE 10 MG: 10 TABLET ORAL at 08:01

## 2023-01-02 RX ADMIN — ENOXAPARIN SODIUM 40 MG: 100 INJECTION SUBCUTANEOUS at 04:01

## 2023-01-02 RX ADMIN — AMPICILLIN SODIUM 2 G: 2 INJECTION, POWDER, FOR SOLUTION INTRAMUSCULAR; INTRAVENOUS at 08:01

## 2023-01-02 RX ADMIN — OXYCODONE AND ACETAMINOPHEN 1 TABLET: 325; 5 TABLET ORAL at 02:01

## 2023-01-02 RX ADMIN — ANTIPRURITIC (ANTI-ITCH): 1 CREAM TOPICAL at 08:01

## 2023-01-02 RX ADMIN — POLYETHYLENE GLYCOL 3350 17 G: 17 POWDER, FOR SOLUTION ORAL at 08:01

## 2023-01-02 RX ADMIN — OXYCODONE AND ACETAMINOPHEN 1 TABLET: 325; 5 TABLET ORAL at 04:01

## 2023-01-02 NOTE — PLAN OF CARE
Problem: Fall Injury Risk  Goal: Absence of Fall and Fall-Related Injury  Outcome: Ongoing, Progressing  Intervention: Identify and Manage Contributors  Flowsheets (Taken 1/2/2023 7613)  Self-Care Promotion: independence encouraged  Medication Review/Management: medications reviewed   Plan of care reviewed with patient. Patients status ongoing progressing.

## 2023-01-02 NOTE — PROGRESS NOTES
[facility] Medicine  Progress Note    Patient Name: Bradley Mcneil  MRN: [id number]  Patient Class: IP- Swing   Admission Date: 12/23/2022  Length of Stay: 10 days  Attending Physician: Merrick Stoner DO  Primary Care Provider: Primary Doctor No        Subjective:     Principal Problem:Infection associated with surgery        HPI:  76yo WM with hx of HTN admit s/p R hip surgical site infection getting IV ABX.  [facility].  For some reason he was transferred here to finish required course of ABX.  He denies any CP, SOB, N/V or diarrhea.  Incision looks good.        Overview/Hospital Course:  12/26 He is doing well.  Doing some exercises in bed for hip.  On IV ABX and will finish course here.  No complaints.    1/2: Mr Mcneil found sitting un bedside chair in NAD. He has no complaints. Normal BM this AM. He is participating well with therapy. Labs reviewed and notable as follows: Sed rate 82. H&H 13.1 and 39.2 which is improved since admission. VS have remained stable. Case discussed with Dr Howard via Telemedicine consultation. Continue current POC with rehabilitation services and IV Ampicillin through 1/5. Plan for D/C when IV abx complete.       Interval History:  Patient seen and examined. No acute events overnight. Continue current POC. Plan for D/C when IV abx are complete on 1/5.    Review of Systems   Constitutional:  Negative for chills, fatigue and fever.   Respiratory:  Negative for shortness of breath.    Cardiovascular:  Negative for chest pain.   Gastrointestinal:  Negative for abdominal pain, diarrhea, nausea and vomiting.   Musculoskeletal:  Positive for arthralgias and myalgias.   Skin:  Negative for wound.   Neurological:  Negative for dizziness and light-headedness. Weakness: generalized.  Psychiatric/Behavioral:  Negative for confusion.    All other systems reviewed and are negative.  Objective:     Vital Signs  (Most Recent):  Temp: 98 °F (36.7 °C) (01/02/23 0718)  Pulse: 70 (01/02/23 0718)  Resp: 18 (01/02/23 0808)  BP: (!) 150/88 (01/02/23 0718)  SpO2: 95 % (01/02/23 0718)   Vital Signs (24h Range):  Temp:  [97.9 °F (36.6 °C)-98 °F (36.7 °C)] 98 °F (36.7 °C)  Pulse:  [70-79] 70  Resp:  [16-20] 18  SpO2:  [95 %] 95 %  BP: (150-154)/(73-88) 150/88     Weight: 92.9 kg (204 lb 12.8 oz)  Body mass index is 29.39 kg/m².    Intake/Output Summary (Last 24 hours) at 1/2/2023 1208  Last data filed at 1/2/2023 0908  Gross per 24 hour   Intake 3853 ml   Output 600 ml   Net 3253 ml      Physical Exam    Significant Labs: All pertinent labs within the past 24 hours have been reviewed.  BMP:   Recent Labs   Lab 01/02/23  0943   *      K 3.9      CO2 27   BUN 12   CREATININE 0.96   CALCIUM 8.8     CBC:   Recent Labs   Lab 01/02/23  0943   WBC 8.29   HGB 13.1*   HCT 39.2*          Significant Imaging: I have reviewed all pertinent imaging results/findings within the past 24 hours.      Assessment/Plan:      * Infection associated with surgery  Continue Ampicillin as ordered.  Last dose scheduled for 1/5      HTN (hypertension)  Continue home meds and adjust as needed.       VTE Risk Mitigation (From admission, onward)         Ordered     enoxaparin injection 40 mg  Daily         12/23/22 1035     IP VTE HIGH RISK PATIENT  Once         12/23/22 1035     Place sequential compression device  Until discontinued         12/23/22 1035                Discharge Planning   RUFINO: 1/4/2023     Code Status: Full Code   Is the patient medically ready for discharge?:     Reason for patient still in hospital (select all that apply): Patient trending condition, Treatment and PT / OT recommendations  Discharge Plan A: Home 1/5/23                 ANA Mccabe  Department of Hospital Medicine   [facility]

## 2023-01-02 NOTE — NURSING
Nurses Note -- 4 Eyes      1/2/2023   7:09 AM      Skin assessed during: Q Shift Change      [x] No Pressure Injuries Present    []Prevention Measures Documented      [] Yes- Altered Skin Integrity Present or Discovered   [] LDA Added if Not in Epic (Describe Wound)   [] New Altered Skin Integrity was Present on Admit and Documented in LDA   [] Wound Image Taken    Wound Care Consulted? No    Attending Nurse:  TYLER ARRIETA RN     Second RN/Staff Member:  ANNETTE Harvey

## 2023-01-02 NOTE — PT/OT/SLP PROGRESS
Physical Therapy Treatment    Patient Name:  Bradley Mcneil   MRN:  [id number]    Recommendations:     Discharge Recommendations: home health PT  Discharge Equipment Recommendations: none  Barriers to discharge: Inaccessible home, Decreased caregiver support, and ABT    Assessment:     Bradley Mcneil is a 77 y.o. male admitted with a medical diagnosis of Infection associated with surgery.  He presents with the following impairments/functional limitations: weakness, pain, decreased ROM. PTA provided patient with visual and verbal cues for proper form during therapeutic exercises, and proper LE sequencing during stair training. Continued ambulation with RW this date to decrease weight bearing on L wrist due to pain. Patient with no reports of R hip pain throughout treatment.   Rehab Prognosis: Good; patient would benefit from acute skilled PT services to address these deficits and reach maximum level of function.    Recent Surgery: * No surgery found *      Plan:     During this hospitalization, patient to be seen 5 x/week to address the identified rehab impairments via therapeutic activities, gait training, therapeutic exercises and progress toward the following goals:    Plan of Care Expires:  01/06/23    Subjective     Chief Complaint: L wrist pain.   Patient/Family Comments/goals: DC to his home.   Pain/Comfort:  Pain Rating 1: 8/10  Location - Side 1: Left  Location - Orientation 1: generalized  Location 1: wrist  Pain Addressed 1: Other (see comments) (Pt stated that the nurse gave him lidocaine ointment.)      Objective:     Communicated with pt prior to session. Patient found HOB elevated with PICC line upon PTA entry to room.     General Precautions: Standard, fall  Orthopedic Precautions: RLE weight bearing as tolerated  Braces:    Respiratory Status: Room air     Functional Mobility:  Bed Mobility:     Supine to Sit: supervision  Transfers:     Sit to Stand:  supervision with rolling walker  Gait: Patient  ambulated 485 feet with RW + 90 feet with RW and SBA  Balance: Patient performed dyanmic standing balance for dressing and required some assistance for donning and doffing due to L wrist pain and PICC line.  Stairs:  Pt ascended/descended  3 trials of 5 stair(s) with No Assistive Device with Hand rail in R hand with Stand-by Assistance.       AM-PAC 6 CLICK MOBILITY  Turning over in bed (including adjusting bedclothes, sheets and blankets)?: 4  Sitting down on and standing up from a chair with arms (e.g., wheelchair, bedside commode, etc.): 4  Moving from lying on back to sitting on the side of the bed?: 4  Moving to and from a bed to a chair (including a wheelchair)?: 4  Need to walk in hospital room?: 4  Climbing 3-5 steps with a railing?: 4  Basic Mobility Total Score: 24       Treatment & Education:  Patient performed Nustep on L 2 for 10 minutes, and standing: marching 15x, mini squats 15x, and RLE hip ABD 15x    Patient left sitting edge of bed with call button in reach.    GOALS:   Multidisciplinary Problems       Physical Therapy Goals          Problem: Physical Therapy    Goal Priority Disciplines Outcome Goal Variances Interventions   Physical Therapy Goal     PT, PT/OT Ongoing, Progressing     Description: LTG - 2 weeks  1. Pt's Tinetti Balance score will be at least 18/28 points using RW or more appropriate AD.-PROGRESSING  2. Pt's RLE strength will be at least 4/5.-PROGRESSING  3. Pt will gain 5-10 degrees ROM right hip.-MET  4. Pt will negotiate stairs with SBA.-MET  5. Pt will be assessed for progression to SC or QC for ambulation.-PROGRESSING                       Time Tracking:     PT Received On: 01/02/23  PT Start Time: 1452     PT Stop Time: 1525  PT Total Time (min): 33 min     Billable Minutes: Gait Training 10, Therapeutic Activity 5, and Therapeutic Exercise 18    Treatment Type: Treatment  PT/PTA: PTA     PTA Visit Number: 4     01/02/2023

## 2023-01-02 NOTE — NURSING
Nurses Note -- 4 Eyes      1/1/2023   7:45 PM      Skin assessed during: Q Shift Change      [] No Pressure Injuries Present    []Prevention Measures Documented      [] Yes- Altered Skin Integrity Present or Discovered   [] LDA Added if Not in Epic (Describe Wound)   [x] New Altered Skin Integrity was Present on Admit and Documented in LDA   [] Wound Image Taken    Wound Care Consulted? No    Attending Nurse:  Zoraida Harvey RN     Second RN/Staff Member:  Marika Cortes RN

## 2023-01-02 NOTE — PLAN OF CARE
Problem: Adult Inpatient Plan of Care  Goal: Optimal Comfort and Wellbeing  Outcome: Ongoing, Progressing   Pain relief with measures other than meds will be started.

## 2023-01-02 NOTE — SUBJECTIVE & OBJECTIVE
Interval History:  Patient seen and examined. No acute events overnight. Continue current POC. Plan for D/C when IV abx are complete on 1/5.    Review of Systems   Constitutional:  Negative for chills, fatigue and fever.   Respiratory:  Negative for shortness of breath.    Cardiovascular:  Negative for chest pain.   Gastrointestinal:  Negative for abdominal pain, diarrhea, nausea and vomiting.   Musculoskeletal:  Positive for arthralgias and myalgias.   Skin:  Negative for wound.   Neurological:  Negative for dizziness and light-headedness. Weakness: generalized.  Psychiatric/Behavioral:  Negative for confusion.    All other systems reviewed and are negative.  Objective:     Vital Signs (Most Recent):  Temp: 98 °F (36.7 °C) (01/02/23 0718)  Pulse: 70 (01/02/23 0718)  Resp: 18 (01/02/23 0808)  BP: (!) 150/88 (01/02/23 0718)  SpO2: 95 % (01/02/23 0718)   Vital Signs (24h Range):  Temp:  [97.9 °F (36.6 °C)-98 °F (36.7 °C)] 98 °F (36.7 °C)  Pulse:  [70-79] 70  Resp:  [16-20] 18  SpO2:  [95 %] 95 %  BP: (150-154)/(73-88) 150/88     Weight: 92.9 kg (204 lb 12.8 oz)  Body mass index is 29.39 kg/m².    Intake/Output Summary (Last 24 hours) at 1/2/2023 1208  Last data filed at 1/2/2023 0908  Gross per 24 hour   Intake 3853 ml   Output 600 ml   Net 3253 ml      Physical Exam    Significant Labs: All pertinent labs within the past 24 hours have been reviewed.  BMP:   Recent Labs   Lab 01/02/23  0943   *      K 3.9      CO2 27   BUN 12   CREATININE 0.96   CALCIUM 8.8     CBC:   Recent Labs   Lab 01/02/23  0943   WBC 8.29   HGB 13.1*   HCT 39.2*          Significant Imaging: I have reviewed all pertinent imaging results/findings within the past 24 hours.

## 2023-01-03 PROCEDURE — 25000003 PHARM REV CODE 250

## 2023-01-03 PROCEDURE — 63600175 PHARM REV CODE 636 W HCPCS

## 2023-01-03 PROCEDURE — 97116 GAIT TRAINING THERAPY: CPT | Mod: CQ

## 2023-01-03 PROCEDURE — 99900035 HC TECH TIME PER 15 MIN (STAT)

## 2023-01-03 PROCEDURE — 11000004 HC SNF PRIVATE

## 2023-01-03 PROCEDURE — 94761 N-INVAS EAR/PLS OXIMETRY MLT: CPT

## 2023-01-03 PROCEDURE — 97110 THERAPEUTIC EXERCISES: CPT | Mod: CQ

## 2023-01-03 PROCEDURE — 27000958

## 2023-01-03 RX ADMIN — AMPICILLIN SODIUM 2 G: 2 INJECTION, POWDER, FOR SOLUTION INTRAMUSCULAR; INTRAVENOUS at 04:01

## 2023-01-03 RX ADMIN — POLYETHYLENE GLYCOL 3350 17 G: 17 POWDER, FOR SOLUTION ORAL at 08:01

## 2023-01-03 RX ADMIN — FERROUS SULFATE TAB 325 MG (65 MG ELEMENTAL FE) 1 EACH: 325 (65 FE) TAB at 08:01

## 2023-01-03 RX ADMIN — AMPICILLIN SODIUM 2 G: 2 INJECTION, POWDER, FOR SOLUTION INTRAMUSCULAR; INTRAVENOUS at 08:01

## 2023-01-03 RX ADMIN — AMPICILLIN SODIUM 2 G: 2 INJECTION, POWDER, FOR SOLUTION INTRAMUSCULAR; INTRAVENOUS at 09:01

## 2023-01-03 RX ADMIN — OXYCODONE AND ACETAMINOPHEN 1 TABLET: 325; 5 TABLET ORAL at 08:01

## 2023-01-03 RX ADMIN — AMLODIPINE BESYLATE 10 MG: 10 TABLET ORAL at 08:01

## 2023-01-03 RX ADMIN — Medication 6 MG: at 12:01

## 2023-01-03 RX ADMIN — AMPICILLIN SODIUM 2 G: 2 INJECTION, POWDER, FOR SOLUTION INTRAMUSCULAR; INTRAVENOUS at 12:01

## 2023-01-03 RX ADMIN — OXYCODONE AND ACETAMINOPHEN 1 TABLET: 325; 5 TABLET ORAL at 01:01

## 2023-01-03 RX ADMIN — ANTIPRURITIC (ANTI-ITCH): 1 CREAM TOPICAL at 08:01

## 2023-01-03 RX ADMIN — ENOXAPARIN SODIUM 40 MG: 100 INJECTION SUBCUTANEOUS at 04:01

## 2023-01-03 RX ADMIN — CETIRIZINE HYDROCHLORIDE 10 MG: 10 TABLET, FILM COATED ORAL at 08:01

## 2023-01-03 NOTE — PT/OT/SLP PROGRESS
Physical Therapy Treatment    Patient Name:  Bradley Mcneil   MRN:  [id number]    Recommendations:     Discharge Recommendations: home health PT  Discharge Equipment Recommendations: none  Barriers to discharge: Inaccessible home, Decreased caregiver support, and ABT    Assessment:     Bradley Mcneil is a 77 y.o. male admitted with a medical diagnosis of Infection associated with surgery.  He presents with the following impairments/functional limitations: weakness, pain, decreased ROM. Patient able to ambulate on unlevel sidewalk and asphalt with SBA this date. Patient reported wrist pain during ambulating, so cold pack applied at the end of treatment for 12 minutes to decrease pain. After completion of treatment patient stated that his wrist pain was a 6/10.  Rehab Prognosis: Good; patient would benefit from acute skilled PT services to address these deficits and reach maximum level of function.    Recent Surgery: * No surgery found *      Plan:     During this hospitalization, patient to be seen 5 x/week to address the identified rehab impairments via gait training, therapeutic activities, therapeutic exercises and progress toward the following goals:    Plan of Care Expires:  01/06/23    Subjective     Chief Complaint: L wrist pain and R hip pain.   Patient/Family Comments/goals: DC to his home.   Pain/Comfort:  Pain Rating 1: 7/10  Location - Side 1: Left  Location - Orientation 1: generalized  Location 1: wrist  Pain Addressed 1: Pre-medicate for activity, Other (see comments) (Cold pack after treatment)  Pain Rating 2: 4/10  Location - Side 2: Right  Location - Orientation 2: generalized  Location 2: hip  Pain Addressed 2: Pre-medicate for activity      Objective:     Communicated with pt prior to session. Patient found  standing in room with RW  with PICC line upon PTA entry to room.     General Precautions: Standard, fall  Orthopedic Precautions: RLE weight bearing as tolerated  Braces:    Respiratory  Status: Room air     Functional Mobility:  Bed Mobility:     Sit to Supine: independence  Transfers:     Sit to Stand:  supervision with rolling walker  Gait: Patient ambulated 475 feet on level surface + ascending and descending 30 foot ramp + 95 feet on side walk and asphalt with RW and SBA. Pt then ambulated 100 feet on level surface to return to room with RW and SBA  Stairs:  Pt ascended/descended  5 stair(s) with No Assistive Device with Hand rail in R hand with Stand-by Assistance.       AM-PAC 6 CLICK MOBILITY  Turning over in bed (including adjusting bedclothes, sheets and blankets)?: 4  Sitting down on and standing up from a chair with arms (e.g., wheelchair, bedside commode, etc.): 4  Moving from lying on back to sitting on the side of the bed?: 4  Moving to and from a bed to a chair (including a wheelchair)?: 4  Need to walk in hospital room?: 4  Climbing 3-5 steps with a railing?: 4  Basic Mobility Total Score: 24       Treatment & Education:  Standing: marching 15x, mini squats 15x, and R hip ABD 15x, R hip EXT 15x, R HS curls 15x    Patient left left sidelying with call button in reach.    GOALS:   Multidisciplinary Problems       Physical Therapy Goals          Problem: Physical Therapy    Goal Priority Disciplines Outcome Goal Variances Interventions   Physical Therapy Goal     PT, PT/OT Ongoing, Progressing     Description: LTG - 2 weeks  1. Pt's Tinetti Balance score will be at least 18/28 points using RW or more appropriate AD.-PROGRESSING  2. Pt's RLE strength will be at least 4/5.-PROGRESSING  3. Pt will gain 5-10 degrees ROM right hip.-MET  4. Pt will negotiate stairs with SBA.-MET  5. Pt will be assessed for progression to SC or QC for ambulation.-PROGRESSING                       Time Tracking:     PT Received On: 01/03/23  PT Start Time: 0925     PT Stop Time: 1005  PT Total Time (min): 40 min (Non billable 12 minutes for cold pack application)    Billable Minutes: Gait Training 20 and  Therapeutic Exercise 12    Treatment Type: Treatment  PT/PTA: PTA     PTA Visit Number: 5     01/03/2023

## 2023-01-03 NOTE — PLAN OF CARE
[facility] - Medical Surgical Unit - Skilled Nursing Facility  Patient: Bradley Mcneil     Interdisciplinary Team Meeting     Today's Date: 1/3/2023     Estimated D/C Date: 1/5       Primary Physician:  braulio PCP    Pharmacy:  Unit Director: Hannah Hayes   Care Management: Remington Kowalski Physical/Occupational Therapy: Amy Gonzalez/Dominique Hernandez   Speech Therapy: Sima Joel Activity Therapy: Birdie Lee    Dietician: Darshana Noel  Pharmacist: Ryder Villar  Respiratory therapist: Vianey Borges       Nurse  New Symptoms/Problems: none  Last BM: 1/2/2023 Urine: continent Diarrhea: No   Constipated: No Bowel: continent Harvey: no   Isolation: No     O2: room air     Nutrition: regular diet  Speech/Swallowing: not following  Aspiration Precautions: No  Cognition: alert and oriented      Physical Therapy  Gait: ambulating with RW ELOS: dc 1/5   Transfers: independent Range of Motion/Restrictions: RLE weight bearing as tolerated     Occupational Therapy-  Not following   Eating/Grooming: ----   Toileting: ---- Bathing: ----   Dressing (Upper Body): ----- Dressing (Lower Body): -----         Tx Plan/Recommendations reviewed with patient on (date) 1/3 by phone.  Additional family Conference/Training: none  D/C Plan/Recommendations: home with home health      Pharmacy  Medication Changes (see MD orders in chart): No  MD/NP: Adenike Tello NP Labs Reviewed: 1/3 New Lab Orders: none     MD/NP Signature: Time:

## 2023-01-03 NOTE — PROGRESS NOTES
Clinical Pharmacy Chart Review Note      Admit Date: 12/23/2022   LOS: 11 days       Bradley Mcneil is a 77 y.o. male admitted to SNF for PT/OT after hospitalization for r hip surgical site infection.    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     Review of patient's allergies indicates:  No Known Allergies  Patient Active Problem List    Diagnosis Date Noted    Infection associated with surgery 12/23/2022    HTN (hypertension) 12/23/2022       Scheduled Meds:    amLODIPine  10 mg Oral Daily    ampicillin IVPB  2 g Intravenous Q4H    cetirizine  10 mg Oral Daily    enoxparin  40 mg Subcutaneous Daily    ergocalciferol  50,000 Units Oral Q7 Days    ferrous sulfate  1 tablet Oral Daily    hydrocortisone   Topical (Top) BID    polyethylene glycol  17 g Oral Daily     Continuous Infusions:   PRN Meds: acetaminophen, acetaminophen, albuterol-ipratropium, calcium carbonate, melatonin, ondansetron, oxyCODONE-acetaminophen, senna-docusate 8.6-50 mg    OBJECTIVE:     Vital Signs (Last 24H)  Temp:  [97.9 °F (36.6 °C)-98.2 °F (36.8 °C)]   Pulse:  [71-89]   Resp:  [18-20]   BP: (148-158)/(76-84)   SpO2:  [95 %-98 %]     Laboratory:  CBC:   Recent Labs   Lab 01/02/23  0943   WBC 8.29   RBC 3.92*   HGB 13.1*   HCT 39.2*      .0*   MCH 33.4*   MCHC 33.4     BMP:   Recent Labs   Lab 01/02/23  0943   *      K 3.9      CO2 27   BUN 12   CREATININE 0.96   CALCIUM 8.8     .      ASSESSMENT/PLAN:     Estimated Creatinine Clearance: 71.5 mL/min (based on SCr of 0.96 mg/dL).Medications reviewed, no dose adjustments needed. Weekly swing bed medication regimen review complete.  Will continue to monitor.  Ryder Villar, Pharm. D.  Director of Pharmacy  [facility]  [phone]  [email or web address]

## 2023-01-03 NOTE — NURSING
Nurses Note -- 4 Eyes      1/2/2023   7:32 PM      Skin assessed during: Q Shift Change      [] No Pressure Injuries Present    []Prevention Measures Documented      [] Yes- Altered Skin Integrity Present or Discovered   [] LDA Added if Not in Epic (Describe Wound)   [x] New Altered Skin Integrity was Present on Admit and Documented in LDA   [] Wound Image Taken    Wound Care Consulted? No    Attending Nurse:  Zoraida Harvey RN     Second RN/Staff Member: Rosa French RN

## 2023-01-03 NOTE — PROGRESS NOTES
"[facility] - Medical Surgical Unit  Adult Nutrition  Follow-up Note         Reason for Assessment  Reason For Assessment: RD follow-up  Nutrition Risk Screen: no indicators present  RD consult received and appreciated.     Recommend continue current diet as medically appropriate and tolerated. Monitor need to modify diet to Cardiac Diet order d/t pt with HTN. Encourage good PO intakes.     Per H&P,   "78yo WM with hx of HTN admit s/p R hip surgical site infection getting IV ABX.  [facility].  For some reason he was transferred here to finish required course of ABX.  He denies any CP, SOB, N/V or diarrhea.  Incision looks good."     Per NP note since last RD review:  "1/2: Patient seen and examined. No acute events overnight. Continue current POC. Plan for D/C when IV abx are complete on 1/5."     Patient currently receiving Regular Diet with 100% PO intake documented (two instances of 50% PO intake on 1/1, otherwise 100%). Monitor need for Cardiac Diet modification d/t pt with HTN. Encourage good PO intakes.      CBW 92.9kg considered over IBW range, BMI overweight per geriatric guidelines - nutritional needs adjusted. Pt wt down from 208lb 1.6 oz on 12/23 to 204lb 12.8oz on 12/31 - will continue to monitor weight trend.     Last BM 12/22 per flowsheets - pt receiving polyethylene glycol per med list. Will continue to monitor PO intake, weight trend, meds, labs, updates in patient condition. RD following.     Malnutrition  Is Patient Malnourished: No     Nutrition Diagnosis  Decreased nutrient needs of Na, cholesterol, saturated fats  related to Chronic illness as evidenced by pt with HTN     Nutrition Diagnosis Status: Chronic/ continues     Nutrition Risk  Level of Risk/Frequency of Follow-up: low   Chewing or Swallowing Difficulty?: No Chewing or swallowing difficulty    Estimated/Assessed Needs    Temp: 97.9 °F (36.6 °C)Oral  Weight Used For Calorie Calculations: 80.1 " kg (176 lb 8.5 oz) (adjusted body weight)   Energy Need Method: Kcal/kg (25-30 kcal/kg adj body weight) Energy Calorie Requirements (kcal): 9701-5149 kcal  Weight Used For Protein Calculations: 80.1 kg (176 lb 8.5 oz) (adjusted body weight)  Protein Requirements: 80-96g pro (1.0-1.2g pro/kg adj body weight)  Estimated Fluid Requirement Method: RDA Method    RDA Method (mL): 2002     Nutrition Prescription / Recommendations  Recommendation/Intervention: Recommend continue current diet as medically appropriate and tolerated. Monitor need to modify diet to Cardiac Diet order d/t pt with HTN. Encourage good PO intakes.  Goals: PO intake >75%, weight maintenance  Nutrition Goal Status: progressing towards goal  Current Diet Order: Regular Diet  Nutrition Order Comments: Monitor need for Cardiac Diet as pt with HTN  Recommended Diet: Regular Monitor need for cardiac Diet as pt with HTN  Recommended Oral Supplement: No Oral Supplements  Is Nutrition Support Recommended: No  Is Education Recommended: No    Monitor and Evaluation  % current Intake: Other: 100%, 50% on 1/1  % intake to meet estimated needs: 75 - 100 %  Food and Nutrient Intake: energy intake, food and beverage intake  Food and Nutrient Adminstration: diet order  Knowledge/Beliefs/Attitudes: food and nutrition knowledge/skill, beliefs and attitudes  Physical Activity and Function: nutrition-related ADLs and IADLs, factors affecting access to physical activity  Anthropometric Measurements: weight, weight change, body mass index  Biochemical Data, Medical Tests and Procedures: electrolyte and renal panel, gastrointestinal profile, glucose/endocrine profile, inflammatory profile, lipid profile  Nutrition-Focused Physical Findings: overall appearance, extremities, muscles and bones, head and eyes, skin     Current Medical Diagnosis and Past Medical History  Diagnosis: infection/sepsis (infection associated with surgery)  No past medical history on  "file.    Nutrition/Diet History  Spiritual, Cultural Beliefs, Latter day Practices, Values that Affect Care: no  Food Allergies: NKFA    Lab/Procedures/Meds  Recent Labs   Lab 01/02/23  0943      K 3.9   BUN 12   CREATININE 0.96   *   CALCIUM 8.8        Last A1c:   Lab Results   Component Value Date    HGBA1C 6.0 12/24/2022     Lab Results   Component Value Date    RBC 3.92 (L) 01/02/2023    HGB 13.1 (L) 01/02/2023    HCT 39.2 (L) 01/02/2023    .0 (H) 01/02/2023    MCH 33.4 (H) 01/02/2023    MCHC 33.4 01/02/2023     Pertinent Labs Reviewed: reviewed   (H) - pt with no PMH of DM, will continue to monitor  Pertinent Medications Reviewed: reviewed  Amlodipine, omnipen, cetirizine, enoxaparin, ergocalciferol, ferrous sulfate, hydrocortisone, polyethylene glycol, melatonin PRN, oxycodone-acetaminophen PRN     Anthropometrics  Temp: 97.9 °F (36.6 °C)  Height Method: Stated  Height: 5' 10" (177.8 cm)  Height (inches): 70 in  Weight Method: Bed Scale  Weight: 92.9 kg (204 lb 12.8 oz)  Weight (lb): 204.8 lb  Ideal Body Weight (IBW), Male: 166 lb  % Ideal Body Weight, Male (lb): 125.36 %  BMI (Calculated): 29.4     Nutrition by Nursing  Diet/Nutrition Received: regular  Intake (%): 100%  Diet/Feeding Assistance: tray set-up  Diet/Feeding Tolerance: good  Last Bowel Movement: 01/02/23    Nutrition Follow-Up  RD Follow-up?: Yes  "

## 2023-01-03 NOTE — NURSING
Nurses Note -- 4 Eyes      1/3/2023   7:18 AM      Skin assessed during: Q Shift Change      [x] No Pressure Injuries Present    []Prevention Measures Documented      [] Yes- Altered Skin Integrity Present or Discovered   [] LDA Added if Not in Epic (Describe Wound)   [] New Altered Skin Integrity was Present on Admit and Documented in LDA   [] Wound Image Taken    Wound Care Consulted? No    Attending Nurse:  TYLER ARRIETA RN     Second RN/Staff Member:  harry Harvey

## 2023-01-03 NOTE — PLAN OF CARE
Problem: Pain Acute  Goal: Acceptable Pain Control and Functional Ability  Outcome: Ongoing, Progressing  Intervention: Optimize Psychosocial Wellbeing  Flowsheets (Taken 1/3/2023 8049)  Spiritual Activities Assistance: affirmation provided  Supportive Measures: decision-making supported  Diversional Activities:   movies   reading   Plan of care reviewed with patient. Patients status ongoing progressing.

## 2023-01-04 PROCEDURE — 97116 GAIT TRAINING THERAPY: CPT

## 2023-01-04 PROCEDURE — 94761 N-INVAS EAR/PLS OXIMETRY MLT: CPT

## 2023-01-04 PROCEDURE — 97110 THERAPEUTIC EXERCISES: CPT

## 2023-01-04 PROCEDURE — 63600175 PHARM REV CODE 636 W HCPCS

## 2023-01-04 PROCEDURE — 97530 THERAPEUTIC ACTIVITIES: CPT

## 2023-01-04 PROCEDURE — 11000004 HC SNF PRIVATE

## 2023-01-04 PROCEDURE — 99900035 HC TECH TIME PER 15 MIN (STAT)

## 2023-01-04 PROCEDURE — 25000003 PHARM REV CODE 250

## 2023-01-04 RX ADMIN — FERROUS SULFATE TAB 325 MG (65 MG ELEMENTAL FE) 1 EACH: 325 (65 FE) TAB at 08:01

## 2023-01-04 RX ADMIN — AMPICILLIN SODIUM 2 G: 2 INJECTION, POWDER, FOR SOLUTION INTRAMUSCULAR; INTRAVENOUS at 03:01

## 2023-01-04 RX ADMIN — AMLODIPINE BESYLATE 10 MG: 10 TABLET ORAL at 08:01

## 2023-01-04 RX ADMIN — AMPICILLIN SODIUM 2 G: 2 INJECTION, POWDER, FOR SOLUTION INTRAMUSCULAR; INTRAVENOUS at 12:01

## 2023-01-04 RX ADMIN — ANTIPRURITIC (ANTI-ITCH): 1 CREAM TOPICAL at 09:01

## 2023-01-04 RX ADMIN — OXYCODONE AND ACETAMINOPHEN 1 TABLET: 325; 5 TABLET ORAL at 04:01

## 2023-01-04 RX ADMIN — OXYCODONE AND ACETAMINOPHEN 1 TABLET: 325; 5 TABLET ORAL at 08:01

## 2023-01-04 RX ADMIN — AMPICILLIN SODIUM 2 G: 2 INJECTION, POWDER, FOR SOLUTION INTRAMUSCULAR; INTRAVENOUS at 04:01

## 2023-01-04 RX ADMIN — CETIRIZINE HYDROCHLORIDE 10 MG: 10 TABLET, FILM COATED ORAL at 08:01

## 2023-01-04 RX ADMIN — POLYETHYLENE GLYCOL 3350 17 G: 17 POWDER, FOR SOLUTION ORAL at 08:01

## 2023-01-04 RX ADMIN — AMPICILLIN SODIUM 2 G: 2 INJECTION, POWDER, FOR SOLUTION INTRAMUSCULAR; INTRAVENOUS at 08:01

## 2023-01-04 RX ADMIN — ENOXAPARIN SODIUM 40 MG: 100 INJECTION SUBCUTANEOUS at 04:01

## 2023-01-04 NOTE — PT/OT/SLP PROGRESS
Physical Therapy Treatment    Patient Name:  Bradley Mcneil   MRN:  [id number]    Recommendations:     Discharge Recommendations: home health PT  Discharge Equipment Recommendations: none  Barriers to discharge: None    Assessment:     Bradley Mcneil is a 77 y.o. male admitted with a medical diagnosis of Infection associated with surgery.  He presents with the following impairments/functional limitations: weakness, pain, edema . Pt has met all LTGs set for him. He had progressed to amb'g with a SC until he developed swelling/pain left wrist which pt states is from and IV access site.    Rehab Prognosis: Good; patient would benefit from acute skilled PT services to address these deficits and reach maximum level of function.    Recent Surgery: * No surgery found *      Plan:     During this hospitalization, patient to be seen 5 x/week to address the identified rehab impairments via gait training, therapeutic activities, therapeutic exercises and progress toward the following goals:    Plan of Care Expires:  01/06/23    Subjective     Chief Complaint: Left wrist pain  Patient/Family Comments/goals: Pt to dc home tomorrow with home health PT.  Pain/Comfort:  Pain Rating 1: 3/10  Location - Side 1: Right  Location - Orientation 1: lower  Location 1: hip  Pain Addressed 1: Pre-medicate for activity  Pain Rating Post-Intervention 1: 4/10  Pain Rating 2: 5/10  Location - Side 2: Left  Location - Orientation 2: upper  Location 2: wrist  Pain Addressed 2: Pre-medicate for activity, Cessation of Activity  Pain Rating Post-Intervention 2: 5/10      Objective:     Communicated with nsg prior to session.  Patient found sitting edge of bed with PICC line upon PT entry to room.     General Precautions: Standard, fall  Orthopedic Precautions: RLE weight bearing as tolerated, RUE weight bearing as tolerated (RIGHT WRIST PAIN)  Braces:    Respiratory Status: Room air     Functional Mobility:  Bed Mobility:     Rolling Left:   independence  Rolling Right: independence  Scooting: independence  Bridging: independence  Supine to Sit: independence  Sit to Supine: independence  Transfers:     Sit to Stand:  independence with no AD  Bed to Chair: modified independence with  rolling walker  using  Stand Pivot  Toilet Transfer: modified independence with  rolling walker  using  Stand Pivot  Gait: Pt amb's level surfaces with Modif indep using RW and unlevel surfaces with SBA/svn.  Stairs:  Pt ascended/descended 5 stair(s) with Rolling Walker with right handrail with Supervision or Set-up Assistance.   Tinetti Total Score:   24 < 18 using RW = High Risk of Falls, 19-23 = Moderate Risk of Falls, > 24 = Low Risk of Falls      AM-PAC 6 CLICK MOBILITY  Turning over in bed (including adjusting bedclothes, sheets and blankets)?: 4  Sitting down on and standing up from a chair with arms (e.g., wheelchair, bedside commode, etc.): 4  Moving from lying on back to sitting on the side of the bed?: 4  Moving to and from a bed to a chair (including a wheelchair)?: 4  Need to walk in hospital room?: 4  Climbing 3-5 steps with a railing?: 3  Basic Mobility Total Score: 23       Treatment & Education:  Pt amb'd with RW to/from PT gym. Nu-step at Level 3.0 using BLE only. PT reassessed MMT, ROM and Tinetti. Pt amb'd up/down stairs with svn and on concrete sidewalk/ asphalt parking lot with svn using RW x 400+ ft.  ROM: Hip flex=105 deg, hip ER= 33 deg, hip abd=17.   MMT RLE: Hip flex=4-/5, hip exp=4/5, hip abd/add=4+/5, hip IR/ER=4/5, KNEE EXT=5/5, PF=5/5.    Patient left sitting edge of bed with call button in reach..    GOALS:   Multidisciplinary Problems       Physical Therapy Goals          Problem: Physical Therapy    Goal Priority Disciplines Outcome Goal Variances Interventions   Physical Therapy Goal     PT, PT/OT Ongoing, Progressing     Description: LTG - 2 weeks  1. Pt's Tinetti Balance score will be at least 18/28 points using RW or more appropriate  AD.-PROGRESSING  2. Pt's RLE strength will be at least 4/5.-PROGRESSING  3. Pt will gain 5-10 degrees ROM right hip.-MET  4. Pt will negotiate stairs with SBA.-MET  5. Pt will be assessed for progression to SC or QC for ambulation.-PROGRESSING                       Time Tracking:     PT Received On: 01/04/23  PT Start Time: 1425     PT Stop Time: 1510  PT Total Time (min): 45 min     Billable Minutes: Gait Training 15, Therapeutic Activity 10, Therapeutic Exercise 20, and Total Time 45 min    Treatment Type: Treatment  PT/PTA: PT     PTA Visit Number: 0     01/04/2023

## 2023-01-04 NOTE — NURSING
Nurses Note -- 4 Eyes      1/4/2023   7:29 AM      Skin assessed during: Q Shift Change      [x] No Pressure Injuries Present    []Prevention Measures Documented      [] Yes- Altered Skin Integrity Present or Discovered   [] LDA Added if Not in Epic (Describe Wound)   [] New Altered Skin Integrity was Present on Admit and Documented in LDA   [] Wound Image Taken    Wound Care Consulted? No    Attending Nurse:  TYLER ARRIETA RN     Second RN/Staff Member:  ANNETTE Harvey

## 2023-01-04 NOTE — PLAN OF CARE
Problem: Pain Acute  Goal: Acceptable Pain Control and Functional Ability  Outcome: Ongoing, Progressing  Intervention: Optimize Psychosocial Wellbeing  Flowsheets (Taken 1/4/2023 0430)  Spiritual Activities Assistance: personal rituals encouraged  Supportive Measures: positive reinforcement provided  Diversional Activities:   television   reading   Plan of care reviewed with patient. Patients status ongoing progressing.

## 2023-01-04 NOTE — NURSING
Nurses Note -- 4 Eyes      1/3/2023   7:29 PM      Skin assessed during: Q Shift Change      [] No Pressure Injuries Present    []Prevention Measures Documented      [] Yes- Altered Skin Integrity Present or Discovered   [] LDA Added if Not in Epic (Describe Wound)   [x] New Altered Skin Integrity was Present on Admit and Documented in LDA   [] Wound Image Taken    Wound Care Consulted? No    Attending Nurse:  Zoraida Harvey RN     Second RN/Staff Member:  Rosa French RN

## 2023-01-04 NOTE — PLAN OF CARE
Problem: Physical Therapy  Goal: Physical Therapy Goal  Description: LTG - 2 weeks  1. Pt's Tinetti Balance score will be at least 18/28 points using RW or more appropriate AD.-MET  2. Pt's RLE strength will be at least 4/5.-MET  3. Pt will gain 5-10 degrees ROM right hip.-MET  4. Pt will negotiate stairs with SBA.-MET  5. Pt will be assessed for progression to SC or QC for ambulation.-MET  Outcome: Ongoing, Progressing

## 2023-01-05 VITALS
RESPIRATION RATE: 20 BRPM | SYSTOLIC BLOOD PRESSURE: 150 MMHG | TEMPERATURE: 98 F | HEIGHT: 70 IN | WEIGHT: 204.81 LBS | OXYGEN SATURATION: 97 % | BODY MASS INDEX: 29.32 KG/M2 | HEART RATE: 100 BPM | DIASTOLIC BLOOD PRESSURE: 85 MMHG

## 2023-01-05 PROBLEM — T81.40XA: Status: RESOLVED | Noted: 2022-12-23 | Resolved: 2023-01-05

## 2023-01-05 PROCEDURE — 99900035 HC TECH TIME PER 15 MIN (STAT)

## 2023-01-05 PROCEDURE — 25000003 PHARM REV CODE 250

## 2023-01-05 PROCEDURE — 94761 N-INVAS EAR/PLS OXIMETRY MLT: CPT

## 2023-01-05 PROCEDURE — 63600175 PHARM REV CODE 636 W HCPCS

## 2023-01-05 RX ORDER — ERGOCALCIFEROL 1.25 MG/1
50000 CAPSULE ORAL
Qty: 30 CAPSULE | Refills: 0 | Status: SHIPPED | OUTPATIENT
Start: 2023-01-09

## 2023-01-05 RX ORDER — CETIRIZINE HYDROCHLORIDE 10 MG/1
10 TABLET ORAL DAILY
Qty: 30 TABLET | Refills: 2 | Status: SHIPPED | OUTPATIENT
Start: 2023-01-05 | End: 2023-02-04

## 2023-01-05 RX ORDER — AMLODIPINE BESYLATE 10 MG/1
10 TABLET ORAL DAILY
Qty: 30 TABLET | Refills: 2 | Status: SHIPPED | OUTPATIENT
Start: 2023-01-05 | End: 2024-01-05

## 2023-01-05 RX ADMIN — AMLODIPINE BESYLATE 10 MG: 10 TABLET ORAL at 08:01

## 2023-01-05 RX ADMIN — POLYETHYLENE GLYCOL 3350 17 G: 17 POWDER, FOR SOLUTION ORAL at 08:01

## 2023-01-05 RX ADMIN — CETIRIZINE HYDROCHLORIDE 10 MG: 10 TABLET, FILM COATED ORAL at 08:01

## 2023-01-05 RX ADMIN — AMPICILLIN SODIUM 2 G: 2 INJECTION, POWDER, FOR SOLUTION INTRAMUSCULAR; INTRAVENOUS at 12:01

## 2023-01-05 RX ADMIN — OXYCODONE AND ACETAMINOPHEN 1 TABLET: 325; 5 TABLET ORAL at 03:01

## 2023-01-05 RX ADMIN — FERROUS SULFATE TAB 325 MG (65 MG ELEMENTAL FE) 1 EACH: 325 (65 FE) TAB at 08:01

## 2023-01-05 NOTE — DISCHARGE INSTRUCTIONS
Take medications as directed, keep all follow up appointments.  Picc Line removed, pressure dressing applied.  Instructed patient to leave in place until later this afternoon.

## 2023-01-05 NOTE — DISCHARGE SUMMARY
[facility]  Discharge Summary      Patient Name: Bradley Mcneil  MRN: [id number]  Admission Date: 12/23/2022  Hospital Length of Stay: 13 days  Discharge Date and Time:  01/05/2023 11:05 AM  Attending Physician: No att. providers found   Discharging Provider: ANA Summers  Primary Care Provider: Primary Doctor Lisa        HPI: 78yo WM with hx of HTN admit s/p R hip surgical site infection getting IV ABX.  [facility].  For some reason he was transferred here to finish required course of ABX.  He denies any CP, SOB, N/V or diarrhea.  Incision looks good.      * No surgery found *      Hospital Course: Patient received IV Ampicillin daily which was completed today, via PICC.     Consults:   Consults (From admission, onward)          Status Ordering Provider     Inpatient consult to Registered Dietitian/Nutritionist  Once        Provider:  (Not yet assigned)    INGRIS Song            Final Active Diagnoses:    Diagnosis Date Noted POA    HTN (hypertension) [I10] 12/23/2022 Yes      Problems Resolved During this Admission:    Diagnosis Date Noted Date Resolved POA    PRINCIPAL PROBLEM:  Infection associated with surgery [T81.40XA] 12/23/2022 01/05/2023 Yes      Discharged Condition: good    Disposition: Home or Self Care    Follow Up:   Follow-up Information       Merrick Junior MD. Go on 1/10/2023.    Specialty: Orthopedic Surgery  Why: appointment time: 8:00 am.  Contact information:  [address] [facility] [address]  [phone]               Dionicio العلي MD. Go on 1/17/2023.    Specialty: Infectious Diseases  Why: appointment time: 12:30 pm.     ([phone]  clinic number)  Contact information:  [address] [address] [phone]                           Patient Instructions:   No discharge procedures on  file.  Medications:  Reconciled Home Medications:      Medication List        START taking these medications      amLODIPine 10 MG tablet  Commonly known as: NORVASC  Take 1 tablet (10 mg total) by mouth once daily.     cetirizine 10 MG tablet  Commonly known as: ZYRTEC  Take 1 tablet (10 mg total) by mouth once daily.     ergocalciferol 50,000 unit Cap  Commonly known as: ERGOCALCIFEROL  Take 1 capsule (50,000 Units total) by mouth every 7 days.  Start taking on: January 9, 2023              Significant Diagnostic Studies: Labs: All labs within the past 24 hours have been reviewed    Pending Diagnostic Studies:       None          Indwelling Lines/Drains at time of discharge:   Lines/Drains/Airways       Peripherally Inserted Central Catheter Line  Duration             PICC Double Lumen right cephalic -- days                    Time spent on the discharge of patient: 45 minutes         ANA Summers  Department of Hospital Medicine  [facility]

## 2023-01-05 NOTE — NURSING
Mr. Mcneil discharged home, ambulated out of building with assist of a cane.  Picc line removed approximately 1 hr prior to leaving.  Secured with pressure dressing.  Instructed to leave in place until later this afternoon.  Catheter of PICC intact.

## 2023-01-05 NOTE — DISCHARGE SUMMARY
[facility]  Discharge Note  Short Stay    * No surgery found *      OUTCOME: Condition has improved and patient is now ready for discharge.    DISPOSITION: Home or Self Care    FINAL DIAGNOSIS:  Infection associated with surgery    FOLLOWUP: In clinic    DISCHARGE INSTRUCTIONS:  No discharge procedures on file.     TIME SPENT ON DISCHARGE: 35 minutes

## 2023-01-05 NOTE — PLAN OF CARE
01/05/23 0910   Final Note   Assessment Type Final Discharge Note   Anticipated Discharge Disposition Home-Health   What phone number can be called within the next 1-3 days to see how you are doing after discharge? [phone]   Hospital Resources/Appts/Education Provided Provided patient/caregiver with written discharge plan information;Appointments scheduled and added to AVS   Post-Acute Status   Post-Acute Authorization Home Health   Home Health Status Referrals Sent   Discharge Delays None known at this time     Patient discharged home with home health. Referral sent to [facility]. Patient's questions/concerns addressed.

## 2023-02-22 NOTE — PT/OT/SLP PROGRESS
"Physical Therapy Treatment    Patient Name:  Bradley Mcneil   MRN:  [id number]    Recommendations:     Discharge Recommendations: home health PT  Discharge Equipment Recommendations: none  Barriers to discharge: Inaccessible home and Decreased caregiver support    Assessment:     Bradley Mcneil is a 77 y.o. male admitted with a medical diagnosis of Infection associated with surgery.  He presents with the following impairments/functional limitations: impaired balance, gait instability, weakness, decreased ROM . Initiated gait training using a SC today. Pt tolerated well, but is not confident, yet, and will require more training. Treatment cut short this morning due to PT having a morning meeting and will need to be completed this afternoon. Planned to initiate stair training.    Rehab Prognosis: Good; patient would benefit from acute skilled PT services to address these deficits and reach maximum level of function.    Recent Surgery: * No surgery found *      Plan:     During this hospitalization, patient to be seen 5 x/week to address the identified rehab impairments via gait training, therapeutic activities, therapeutic exercises and progress toward the following goals:    Plan of Care Expires:  01/06/23    Subjective     Chief Complaint: Pt states he is not quite "comfortable" ambulating with SC without assistance.  Patient/Family Comments/goals: Pt states he wants to have Home Health PT upon dc from hospital.  Pain/Comfort:  Pain Rating 1: 0/10  Pain Addressed 1: Pre-medicate for activity      Objective:     Communicated with pt prior to session.  Patient found sitting edge of bed with PICC line upon PT entry to room.     General Precautions: Standard, fall  Orthopedic Precautions: RLE weight bearing as tolerated  Braces:    Respiratory Status: Room air     Functional Mobility:  Transfers:     Sit to Stand:  modified independence with rolling walker  Bed to Chair: supervision with  rolling walker  using  Stand " Evaluated by [facility] S[facility] office on 1/5/2023:  = recommended compression stockings (knee high) 20-30mmHg  = No further intervention  Continue Furosemide 40mg daily  Non ambulatory  "Pivot  Toilet Transfer: supervision with  rolling walker  using  Stand Pivot  Gait: Pt amb'd using RW from room to PT Dept for approx 80 ft with svn. Gait training with SC in left hand with HHA/min Ax1 for right hand x 80 ft from PT Dept back to room.       AM-PAC 6 CLICK MOBILITY          Treatment & Education:  Pt sat on EOB and leaned fwd to stretch glutes 2x30". Pt amb'd to PT Dept as stated above. Nu-Step at Level 2.5 using BLE only x 10 minutes. Pt was introduced to use of SC in parallel bars with cane in left hand and pt holding onto bar with right hand using 2-point gait pattern. He then progressed to walking outside the bars with HHA/min Ax1 for RUE in the gym x 60 ft. Pt amb'd with SC back to his room using SC x 80 ft with SBA/CGA. Pt instructed to cont using RW in the hosp room/halls due to not safe to amb with SC without assistance.    Patient left sitting edge of bed with call button in reach..    GOALS:   Multidisciplinary Problems       Physical Therapy Goals          Problem: Physical Therapy    Goal Priority Disciplines Outcome Goal Variances Interventions   Physical Therapy Goal     PT, PT/OT Ongoing, Progressing     Description: LTG - 2 weeks  1. Pt's Tinetti Balance score will be at least 18/28 points using RW or more appropriate AD.  2. Pt's RLE strength will be at least 4/5.  3. Pt will gain 5-10 degrees ROM right hip.  4. Pt will negotiate stairs with SBA.  5. Pt will be assessed for progression to SC or QC for ambulation.                       Time Tracking:     PT Received On: 12/28/22  PT Start Time: 0930     PT Stop Time: 0957  PT Total Time (min): 27 min     Billable Minutes: Gait Training 16, Therapeutic Exercise 11, and Total Time 27 min    Treatment Type: Treatment  PT/PTA: PT     PTA Visit Number: 0     12/28/2022  "